# Patient Record
Sex: FEMALE | Race: WHITE | Employment: FULL TIME | URBAN - METROPOLITAN AREA
[De-identification: names, ages, dates, MRNs, and addresses within clinical notes are randomized per-mention and may not be internally consistent; named-entity substitution may affect disease eponyms.]

---

## 2021-12-09 ENCOUNTER — HOSPITAL ENCOUNTER (EMERGENCY)
Age: 51
Discharge: HOME | End: 2021-12-09
Attending: STUDENT IN AN ORGANIZED HEALTH CARE EDUCATION/TRAINING PROGRAM

## 2021-12-09 ENCOUNTER — APPOINTMENT (OUTPATIENT)
Dept: GENERAL RADIOLOGY | Age: 51
End: 2021-12-09
Attending: PHYSICIAN ASSISTANT

## 2021-12-09 VITALS
DIASTOLIC BLOOD PRESSURE: 82 MMHG | RESPIRATION RATE: 16 BRPM | HEIGHT: 64 IN | OXYGEN SATURATION: 97 % | WEIGHT: 230 LBS | TEMPERATURE: 98 F | BODY MASS INDEX: 39.27 KG/M2 | HEART RATE: 60 BPM | SYSTOLIC BLOOD PRESSURE: 143 MMHG

## 2021-12-09 DIAGNOSIS — R05.9 COUGH: Primary | ICD-10-CM

## 2021-12-09 LAB
FLUAV AG NPH QL IA: NEGATIVE
FLUBV AG NPH QL IA: NEGATIVE
SPECIMEN SOURCE: NORMAL

## 2021-12-09 PROCEDURE — 87804 INFLUENZA ASSAY W/OPTIC: CPT

## 2021-12-09 PROCEDURE — 96372 THER/PROPH/DIAG INJ SC/IM: CPT

## 2021-12-09 PROCEDURE — 74011250636 HC RX REV CODE- 250/636: Performed by: STUDENT IN AN ORGANIZED HEALTH CARE EDUCATION/TRAINING PROGRAM

## 2021-12-09 PROCEDURE — 99283 EMERGENCY DEPT VISIT LOW MDM: CPT

## 2021-12-09 PROCEDURE — 71046 X-RAY EXAM CHEST 2 VIEWS: CPT

## 2021-12-09 RX ORDER — DOXYCYCLINE HYCLATE 100 MG
100 TABLET ORAL 2 TIMES DAILY
Qty: 20 TABLET | Refills: 0 | Status: SHIPPED | OUTPATIENT
Start: 2021-12-09 | End: 2021-12-19

## 2021-12-09 RX ORDER — FLUCONAZOLE 150 MG/1
150 TABLET ORAL
Qty: 1 TABLET | Refills: 0 | Status: SHIPPED | OUTPATIENT
Start: 2021-12-09 | End: 2021-12-09

## 2021-12-09 RX ORDER — GUAIFENESIN AND DEXTROMETHORPHAN HYDROBROMIDE 1200; 60 MG/1; MG/1
1 TABLET, EXTENDED RELEASE ORAL EVERY 12 HOURS
Qty: 20 TABLET | Refills: 0 | Status: SHIPPED | OUTPATIENT
Start: 2021-12-09 | End: 2021-12-14

## 2021-12-09 RX ORDER — ALBUTEROL SULFATE 90 UG/1
2 AEROSOL, METERED RESPIRATORY (INHALATION)
Qty: 6.7 G | Refills: 0 | Status: SHIPPED | OUTPATIENT
Start: 2021-12-09

## 2021-12-09 RX ORDER — PREDNISONE 20 MG/1
40 TABLET ORAL DAILY
Qty: 8 TABLET | Refills: 0 | Status: SHIPPED | OUTPATIENT
Start: 2021-12-09 | End: 2021-12-13

## 2021-12-09 RX ORDER — KETOROLAC TROMETHAMINE 30 MG/ML
60 INJECTION, SOLUTION INTRAMUSCULAR; INTRAVENOUS
Status: COMPLETED | OUTPATIENT
Start: 2021-12-09 | End: 2021-12-09

## 2021-12-09 RX ADMIN — KETOROLAC TROMETHAMINE 60 MG: 30 INJECTION, SOLUTION INTRAMUSCULAR at 09:18

## 2021-12-09 NOTE — ED PROVIDER NOTES
40-year-old female patient presents to the emergency department with reports of upper respiratory congestion, cough, shortness of breath, intermittent bouts of chest tightness, headache and fever. She states symptoms started last week. She denies known sick contacts but works in [employer] locally. She is not established with a primary care provider but sees a provider out of [place] via telehealth for medication she is prescribed. She reports history of bicuspid aortic valve, chronic immunodeficiency disorder psoriatic arthritis. She did not get vaccinated for Covid or flu she has a history of issues with vaccines. Cough is  not particularly productive. Chest pressure is described as tightness throughout her chest that comes and goes randomly. She reports fevers as high as 102 earlier this week. No past medical history on file. No past surgical history on file. No family history on file. Social History     Socioeconomic History    Marital status:      Spouse name: Not on file    Number of children: Not on file    Years of education: Not on file    Highest education level: Not on file   Occupational History    Not on file   Tobacco Use    Smoking status: Not on file    Smokeless tobacco: Not on file   Substance and Sexual Activity    Alcohol use: Not on file    Drug use: Not on file    Sexual activity: Not on file   Other Topics Concern    Not on file   Social History Narrative    Not on file     Social Determinants of Health     Financial Resource Strain:     Difficulty of Paying Living Expenses: Not on file   Food Insecurity:     Worried About Running Out of Food in the Last Year: Not on file    Christen of Food in the Last Year: Not on file   Transportation Needs:     Lack of Transportation (Medical): Not on file    Lack of Transportation (Non-Medical):  Not on file   Physical Activity:     Days of Exercise per Week: Not on file    Minutes of Exercise per Session: Not on file   Stress:     Feeling of Stress : Not on file   Social Connections:     Frequency of Communication with Friends and Family: Not on file    Frequency of Social Gatherings with Friends and Family: Not on file    Attends Druze Services: Not on file    Active Member of Clubs or Organizations: Not on file    Attends Club or Organization Meetings: Not on file    Marital Status: Not on file   Intimate Partner Violence:     Fear of Current or Ex-Partner: Not on file    Emotionally Abused: Not on file    Physically Abused: Not on file    Sexually Abused: Not on file   Housing Stability:     Unable to Pay for Housing in the Last Year: Not on file    Number of [place] in the Last Year: Not on file    Unstable Housing in the Last Year: Not on file         ALLERGIES: Latex; Penicillin g; and Stings [sting, bee]    Review of Systems   Constitutional: Positive for chills and fever. Negative for diaphoresis. HENT: Negative for congestion, sneezing and sore throat. Eyes: Negative for visual disturbance. Respiratory: Positive for cough and shortness of breath. Negative for chest tightness and wheezing. Cardiovascular: Negative for chest pain and leg swelling. Gastrointestinal: Negative for abdominal pain, blood in stool, diarrhea, nausea and vomiting. Endocrine: Negative for polyuria. Genitourinary: Negative for difficulty urinating, dysuria, flank pain, hematuria and urgency. Musculoskeletal: Negative for back pain, myalgias, neck pain and neck stiffness. Skin: Negative for color change and rash. Neurological: Negative for dizziness, syncope, speech difficulty, weakness, light-headedness, numbness and headaches. Psychiatric/Behavioral: Negative for behavioral problems. All other systems reviewed and are negative.       Vitals:    12/09/21 0836   BP: 139/70   Pulse: (!) 52   Resp: 16   Temp: 98 °F (36.7 °C)   SpO2: 99%   Weight: 104.3 kg (230 lb)   Height: 5' 4\" (1.626 m)            Physical Exam  Vitals and nursing note reviewed. Constitutional:       General: She is not in acute distress. Appearance: She is well-developed. She is not diaphoretic. Comments: Generally well-appearing female patient, alert and oriented to person place and time. No acute distress, speaks in clear, fluid sentences. HENT:      Head: Normocephalic and atraumatic. Right Ear: Tympanic membrane, ear canal and external ear normal.      Left Ear: Ear canal and external ear normal. There is impacted cerumen. Nose: Nose normal.   Eyes:      Pupils: Pupils are equal, round, and reactive to light. Cardiovascular:      Rate and Rhythm: Normal rate and regular rhythm. Heart sounds: Normal heart sounds. No murmur heard. No friction rub. No gallop. Pulmonary:      Effort: Pulmonary effort is normal. No respiratory distress. Breath sounds: Normal breath sounds. No stridor. No decreased breath sounds, wheezing, rhonchi or rales. Comments: Clear to auscultation throughout. No focal findings of patient coughs significantly with deep inspiration peer  Chest:      Chest wall: No tenderness. Abdominal:      General: There is no distension. Palpations: Abdomen is soft. There is no mass. Tenderness: There is no abdominal tenderness. There is no guarding or rebound. Hernia: No hernia is present. Musculoskeletal:         General: No tenderness or deformity. Normal range of motion. Cervical back: Normal range of motion. Skin:     General: Skin is warm and dry. Neurological:      Mental Status: She is alert and oriented to person, place, and time. Cranial Nerves: No cranial nerve deficit. MDM  Number of Diagnoses or Management Options  Diagnosis management comments: Patient arrives with stable vital signs. EKG is reassuring and shows no evidence of ischemic change or significant tachycardia.   Symptoms consistent with upper respiratory illness. She does have history of immunodeficiency disorder, will err on the side of antibiotics assuming flu is negative. She was tested for Covid days ago with negative results. Amount and/or Complexity of Data Reviewed  Tests in the radiology section of CPT®: ordered and reviewed  Tests in the medicine section of CPT®: ordered and reviewed    Risk of Complications, Morbidity, and/or Mortality  Presenting problems: moderate  Diagnostic procedures: low  Management options: moderate    Patient Progress  Patient progress: stable    ED Course as of 12/09/21 0905   Thu Dec 09, 2021   6660 EKG interpretation: Sinus bradycardia, rate of 54, no ischemia.  [BR]      ED Course User Index  [BR] Levi Don, DO       Procedures

## 2021-12-09 NOTE — DISCHARGE INSTRUCTIONS
Take the antibiotic prescribed as directed. Use the medication for cough and pain as needed.   Return for worsening symptoms, concerns or questions

## 2021-12-09 NOTE — ED TRIAGE NOTES
Last Wed started with headache, sinus congestion, sore throat, and tightness in chest; \"feels like a heviness or something sitting on my chest\" States it is harder to breathe, more on the left side than the right. Masked during triage \"I usually get pneumonia or bronchitis this time of year. \"

## 2022-06-17 ENCOUNTER — OFFICE VISIT (OUTPATIENT)
Dept: INTERNAL MEDICINE CLINIC | Facility: CLINIC | Age: 52
End: 2022-06-17
Payer: COMMERCIAL

## 2022-06-17 VITALS
HEART RATE: 55 BPM | WEIGHT: 263 LBS | TEMPERATURE: 97 F | BODY MASS INDEX: 44.9 KG/M2 | HEIGHT: 64 IN | DIASTOLIC BLOOD PRESSURE: 68 MMHG | SYSTOLIC BLOOD PRESSURE: 124 MMHG | OXYGEN SATURATION: 98 %

## 2022-06-17 DIAGNOSIS — M16.0 PRIMARY OSTEOARTHRITIS OF BOTH HIPS: ICD-10-CM

## 2022-06-17 DIAGNOSIS — R12 HEARTBURN: ICD-10-CM

## 2022-06-17 DIAGNOSIS — F41.1 GENERALIZED ANXIETY DISORDER: Primary | ICD-10-CM

## 2022-06-17 DIAGNOSIS — Q23.1 BICUSPID AORTIC VALVE: ICD-10-CM

## 2022-06-17 DIAGNOSIS — G25.81 RLS (RESTLESS LEGS SYNDROME): ICD-10-CM

## 2022-06-17 PROBLEM — Q23.81 BICUSPID AORTIC VALVE: Status: ACTIVE | Noted: 2022-06-17

## 2022-06-17 PROCEDURE — 99204 OFFICE O/P NEW MOD 45 MIN: CPT | Performed by: NURSE PRACTITIONER

## 2022-06-17 RX ORDER — FLUOXETINE HYDROCHLORIDE 40 MG/1
CAPSULE ORAL
COMMUNITY
Start: 2019-06-10 | End: 2022-06-17 | Stop reason: SDUPTHER

## 2022-06-17 RX ORDER — PRAMIPEXOLE DIHYDROCHLORIDE 1 MG/1
TABLET ORAL
COMMUNITY
End: 2022-06-17 | Stop reason: SDUPTHER

## 2022-06-17 RX ORDER — VITAMIN B COMPLEX
1 CAPSULE ORAL DAILY
COMMUNITY

## 2022-06-17 RX ORDER — CELECOXIB 200 MG/1
CAPSULE ORAL
COMMUNITY
End: 2022-06-17 | Stop reason: SDUPTHER

## 2022-06-17 RX ORDER — OMEPRAZOLE 20 MG/1
TABLET, DELAYED RELEASE ORAL
COMMUNITY
End: 2022-06-17 | Stop reason: SDUPTHER

## 2022-06-17 RX ORDER — PRAMIPEXOLE DIHYDROCHLORIDE 1 MG/1
TABLET ORAL
Qty: 90 TABLET | Refills: 1 | Status: SHIPPED | OUTPATIENT
Start: 2022-06-17

## 2022-06-17 RX ORDER — CELECOXIB 200 MG/1
CAPSULE ORAL
Qty: 180 CAPSULE | Refills: 1 | Status: SHIPPED | OUTPATIENT
Start: 2022-06-17

## 2022-06-17 RX ORDER — CLONAZEPAM 1 MG/1
1 TABLET ORAL 2 TIMES DAILY
Qty: 60 TABLET | Refills: 0 | Status: SHIPPED | OUTPATIENT
Start: 2022-06-17 | End: 2022-07-17

## 2022-06-17 RX ORDER — FLUOXETINE HYDROCHLORIDE 40 MG/1
40 CAPSULE ORAL DAILY
Qty: 90 CAPSULE | Refills: 1 | Status: SHIPPED | OUTPATIENT
Start: 2022-06-17

## 2022-06-17 RX ORDER — CLONAZEPAM 1 MG/1
TABLET ORAL 2 TIMES DAILY
COMMUNITY
Start: 2021-02-10 | End: 2022-06-17 | Stop reason: SDUPTHER

## 2022-06-17 RX ORDER — OMEPRAZOLE 20 MG/1
TABLET, DELAYED RELEASE ORAL
Qty: 180 TABLET | Refills: 1 | Status: SHIPPED | OUTPATIENT
Start: 2022-06-17

## 2022-06-17 ASSESSMENT — ENCOUNTER SYMPTOMS
CONSTIPATION: 0
EYE REDNESS: 0
APNEA: 0
COUGH: 0
DIARRHEA: 0
EYE DISCHARGE: 0
NAUSEA: 0
COLOR CHANGE: 0
EYE PAIN: 0
SHORTNESS OF BREATH: 0
BACK PAIN: 0
SINUS PAIN: 0
EYE ITCHING: 0
ABDOMINAL DISTENTION: 0
ABDOMINAL PAIN: 0

## 2022-06-17 NOTE — PROGRESS NOTES
[address]      PROGRESS NOTE    SUBJECTIVE:   Man Marquis is a 46 y.o. female seen for a follow up visit regarding the following chief complaint:     Chief Complaint   Patient presents with    New Patient       HPI  Patient presents as NP to establish care. HTN: She is compliant with current treatment regimen, denies any intolerance. She denies any headache, dizziness or edema. She takes this for bicuspid aortic valve. She had followed cardiology with last heart catheter 2020. Due every 5 years. Denies any changes or concerns with this. She has been on anxiety and depression management therapy for quiet some time due to history of trauma; managed with klonopin and prozac routinely. She uses klonopin only as needed, not regularly. She takes mirapex nightly for RLS and this works well to manage her symptoms. She is also on celebrex for arthritis, osteoarthritis in hips and psoriatic. She takes prilosec BID to help with SE of metoprolol causing heartburn. She states her symptoms are controlled with this medication and she would like to continue. Due for routine labs. Due for mammogram; states she had one last 1/20; would like to hold on this for now. Due for colon cancer screening. Would like to hold off on this for now. Due for routine PAP. Current Outpatient Medications   Medication Sig Dispense Refill    b complex vitamins capsule Take 1 capsule by mouth daily      Metoprolol Succinate 50 MG CS24 Take 1 tablet by mouth in the morning and at bedtime 180 capsule 1    FLUoxetine (PROZAC) 40 MG capsule Take 1 capsule by mouth daily 90 capsule 1    clonazePAM (KLONOPIN) 1 MG tablet Take 1 tablet by mouth in the morning and at bedtime for 30 days. 60 tablet 0    celecoxib (CELEBREX) 200 MG capsule Celebrex 200 mg capsule  Take 1 capsule twice a day by oral route.  180 capsule 1    omeprazole (PRILOSEC OTC) 20 MG tablet omeprazole 20 mg tablet,delayed release  Take 1 tablet every day by oral route. 180 tablet 1    pramipexole (MIRAPEX) 1 MG tablet Mirapex 1 mg tablet  Take 1 tablet every day by oral route. 90 tablet 1     No current facility-administered medications for this visit. Allergies   Allergen Reactions    Latex Rash     itching    Bee Venom Anaphylaxis    Penicillin G Swelling     Throat swelling, difficulty breathing       History reviewed. No pertinent past medical history. Past Surgical History:   Procedure Laterality Date    ACHILLES TENDON SURGERY  2017    OTHER SURGICAL HISTORY      herniated belly button    PARTIAL HYSTERECTOMY (CERVIX NOT REMOVED)  2014    only right ovary removed    TONSILLECTOMY       Family History   Problem Relation Age of Onset    Stroke Mother     High Blood Pressure Mother     No Known Problems Father      Social History     Tobacco Use    Smoking status: Former Smoker     Quit date: 2020     Years since quittin.0    Smokeless tobacco: Not on file   Substance Use Topics    Alcohol use: Yes     Comment: socially       Review of Systems   Constitutional: Negative for activity change, appetite change, chills, fatigue and fever. HENT: Negative for congestion, ear pain and sinus pain. Eyes: Negative for pain, discharge, redness and itching. Respiratory: Negative for apnea, cough and shortness of breath. Cardiovascular: Negative for chest pain, palpitations and leg swelling. Gastrointestinal: Negative for abdominal distention, abdominal pain, constipation, diarrhea and nausea. Endocrine: Negative for cold intolerance and heat intolerance. Genitourinary: Negative for difficulty urinating, dysuria, enuresis and urgency. Musculoskeletal: Negative for arthralgias, back pain, joint swelling, myalgias and neck pain. Skin: Negative for color change and rash. Neurological: Negative for dizziness, weakness and headaches.    Psychiatric/Behavioral: Negative for behavioral problems and sleep disturbance. The patient is not nervous/anxious. OBJECTIVE:  /68 (Site: Left Upper Arm, Position: Sitting, Cuff Size: Large Adult)   Pulse 55   Temp 97 °F (36.1 °C) (Temporal)   Ht 5' 3.5\" (1.613 m)   Wt 263 lb (119.3 kg)   SpO2 98%   BMI 45.86 kg/m²      Physical Exam  Constitutional:       General: She is not in acute distress. Appearance: Normal appearance. She is not ill-appearing or toxic-appearing. Cardiovascular:      Rate and Rhythm: Normal rate and regular rhythm. Pulmonary:      Effort: Pulmonary effort is normal. No respiratory distress. Skin:     General: Skin is warm and dry. Neurological:      Mental Status: She is alert. Mental status is at baseline. Psychiatric:         Mood and Affect: Mood normal.         Behavior: Behavior normal.         Thought Content: Thought content normal.           [facility]. Diagnoses and all orders for this visit:    Generalized anxiety disorder  -     FLUoxetine (PROZAC) 40 MG capsule; Take 1 capsule by mouth daily  -     clonazePAM (KLONOPIN) 1 MG tablet; Take 1 tablet by mouth in the morning and at bedtime for 30 days. Bicuspid aortic valve  -     Metoprolol Succinate 50 MG CS24; Take 1 tablet by mouth in the morning and at bedtime    Primary osteoarthritis of both hips  -     celecoxib (CELEBREX) 200 MG capsule; Celebrex 200 mg capsule  Take 1 capsule twice a day by oral route. RLS (restless legs syndrome)  -     pramipexole (MIRAPEX) 1 MG tablet; Mirapex 1 mg tablet  Take 1 tablet every day by oral route. Heartburn  -     omeprazole (PRILOSEC OTC) 20 MG tablet; omeprazole 20 mg tablet,delayed release  Take 1 tablet every day by oral route. Continue all medications as perscribed today as these are presumably working well to manage your symptoms. Obtain labs and follow up as scheduled to review and discuss concerns of weight loss management.   Greater than 50% of this 35 min visit was spent counseling the patient about test results, prognosis, importance of compliance, education about disease process, benefits of medications, instructions for management of acute symptoms, and follow up plans. Follow-up and Dispositions    · Return for fasting labs anytime and establish with Dr. Sen Mcdonnell to discuss results. Yassine Hennessy, MONICA, APRN - CNP

## 2022-07-01 ENCOUNTER — TELEPHONE (OUTPATIENT)
Dept: INTERNAL MEDICINE CLINIC | Facility: CLINIC | Age: 52
End: 2022-07-01

## 2022-07-01 DIAGNOSIS — I49.9 CARDIAC ARRHYTHMIA, UNSPECIFIED CARDIAC ARRHYTHMIA TYPE: Primary | ICD-10-CM

## 2022-07-01 NOTE — TELEPHONE ENCOUNTER
I have never seen this patient, saw Luh Friday as a new patient last month; labs ordered;     From: Heidi Barber  To: Dr. Darian Gee: 7/1/2022 12:25 PM EDT  Subject: Labs    I still have not received an order for my labs. Can they be added to my chart so I can get them done asap.  Thank you

## 2022-07-05 ENCOUNTER — TELEPHONE (OUTPATIENT)
Dept: INTERNAL MEDICINE CLINIC | Facility: CLINIC | Age: 52
End: 2022-07-05

## 2022-07-05 NOTE — TELEPHONE ENCOUNTER
Pt was called to be informed that labs was put in. Labs was faxed over to her.     Thank you    Mary Ann Kilgore

## 2022-07-08 ENCOUNTER — TELEMEDICINE (OUTPATIENT)
Dept: INTERNAL MEDICINE CLINIC | Facility: CLINIC | Age: 52
End: 2022-07-08
Payer: COMMERCIAL

## 2022-07-08 DIAGNOSIS — Q23.1 BICUSPID AORTIC VALVE: ICD-10-CM

## 2022-07-08 DIAGNOSIS — F41.1 GENERALIZED ANXIETY DISORDER: Primary | ICD-10-CM

## 2022-07-08 DIAGNOSIS — E66.01 CLASS 3 SEVERE OBESITY DUE TO EXCESS CALORIES WITH SERIOUS COMORBIDITY AND BODY MASS INDEX (BMI) OF 40.0 TO 44.9 IN ADULT (HCC): ICD-10-CM

## 2022-07-08 DIAGNOSIS — Z91.030 BEE STING ALLERGY: ICD-10-CM

## 2022-07-08 DIAGNOSIS — Z12.31 VISIT FOR SCREENING MAMMOGRAM: ICD-10-CM

## 2022-07-08 DIAGNOSIS — Z12.11 SCREEN FOR COLON CANCER: ICD-10-CM

## 2022-07-08 PROBLEM — E66.813 CLASS 3 SEVERE OBESITY DUE TO EXCESS CALORIES IN ADULT: Status: ACTIVE | Noted: 2022-07-08

## 2022-07-08 PROCEDURE — 99214 OFFICE O/P EST MOD 30 MIN: CPT | Performed by: INTERNAL MEDICINE

## 2022-07-08 RX ORDER — EPINEPHRINE 0.3 MG/.3ML
0.3 INJECTION SUBCUTANEOUS ONCE
Qty: 0.3 ML | Refills: 0 | Status: SHIPPED | OUTPATIENT
Start: 2022-07-08 | End: 2022-07-08

## 2022-07-08 ASSESSMENT — ENCOUNTER SYMPTOMS: RESPIRATORY NEGATIVE: 1

## 2022-07-08 NOTE — PROGRESS NOTES
[facility]      2022    Patient Name: Noa Grossman  :  1970    Subjective:    Chief Complaint:  Chief Complaint   Patient presents with    Follow-up         HPI I was at home while conducting this encounter. Consent:  She and/or her healthcare decision maker is aware that this patient-initiated Telehealth encounter is a billable service, with coverage as determined by her insurance carrier. She is aware that she may receive a bill and has provided verbal consent to proceed: Yes    This virtual visit was conducted via [employer]. Pursuant to the emergency declaration under the [facility], 1135 waiver authority and the [employer] Act, this Virtual  Visit was conducted to reduce the patient's risk of exposure to COVID-19 and provide continuity of care for an established patient. Services were provided through a video synchronous discussion virtually to substitute for in-person clinic visit. Due to this being a TeleHealth evaluation, many elements of the physical examination are unable to be assessed. Total Time: minutes: 21-30 minutes. Patient evaluated for f/u visit; She saw Letty Thompson NP in our office as a new patient last month; she plans to have fasting labs done this weekend and will be contacted with results when available; She has history of anxiety, taking Prozac 40 mg qd and Klonopin 1 mg qhs prn and feeling well; She has history of bicuspid aortic valve; she saw Cardiologist in [place] 4 years ago, had cardiac MRI, cardiac cath, stress testing at that time; she is taking Metoprolol as prescribed and is feeling well;    She is concerned about her weight; she has tried Adderall in the past, but developed palpitations; she generally follows a healthy diet, walks for an hour daily; she walks 12,000 steps per day; she usually does a protein shake for breakfast and lunch and tries to eat a healthy dinner; she's tried Wellbutrin in the past and could not tolerate, felt very angry all the time; she is not ready for Bariatric Surgery at this time;     Past Medical History:   Diagnosis Date    Anxiety     Bicuspid aortic valve     Primary osteoarthritis of both hips     RLS (restless legs syndrome)        Past Surgical History:   Procedure Laterality Date    ACHILLES TENDON SURGERY  2017    OTHER SURGICAL HISTORY      herniated belly button    PARTIAL HYSTERECTOMY (CERVIX NOT REMOVED)  2014    only right ovary removed    TONSILLECTOMY         Family History   Problem Relation Age of Onset    Stroke Mother     High Blood Pressure Mother     Uterine Cancer Mother     No Known Problems Father     Hypertension Maternal Grandmother     Diabetes Maternal Grandmother     Breast Cancer Maternal Aunt        Social History     Tobacco Use    Smoking status: Former Smoker     Quit date: 2020     Years since quittin.0    Smokeless tobacco: Never Used   Vaping Use    Vaping Use: Never used   Substance Use Topics    Alcohol use: Yes     Comment: socially    Drug use: Never                 Current Outpatient Medications:     orlistat (XENICAL) 120 MG capsule, Take 1 capsule by mouth 3 times daily (with meals), Disp: 90 capsule, Rfl: 2    EPINEPHrine (EPIPEN 2-IDALIA) 0.3 MG/0.3ML SOAJ injection, Inject 0.3 mLs into the skin once for 1 dose Use as directed for allergic reaction, Disp: 0.3 mL, Rfl: 0    b complex vitamins capsule, Take 1 capsule by mouth daily, Disp: , Rfl:     Metoprolol Succinate 50 MG CS24, Take 1 tablet by mouth in the morning and at bedtime, Disp: 180 capsule, Rfl: 1    FLUoxetine (PROZAC) 40 MG capsule, Take 1 capsule by mouth daily, Disp: 90 capsule, Rfl: 1    clonazePAM (KLONOPIN) 1 MG tablet, Take 1 tablet by mouth in the morning and at bedtime for 30 days. , Disp: 60 tablet, Rfl: 0    celecoxib (CELEBREX) 200 MG capsule, Celebrex 200 mg capsule Take 1 capsule twice a day by oral route., Disp: 180 capsule, Rfl: 1    omeprazole (PRILOSEC OTC) 20 MG tablet, omeprazole 20 mg tablet,delayed release  Take 1 tablet every day by oral route., Disp: 180 tablet, Rfl: 1    pramipexole (MIRAPEX) 1 MG tablet, Mirapex 1 mg tablet  Take 1 tablet every day by oral route., Disp: 90 tablet, Rfl: 1    Allergies   Allergen Reactions    Latex Rash and Other (See Comments)     itching    Bee Venom Anaphylaxis    Penicillin G Swelling     Throat swelling, difficulty breathing    Other        Review of Systems   Constitutional: Positive for unexpected weight change. HENT: Negative. Respiratory: Negative. Cardiovascular: Negative. Objective: There were no vitals taken for this visit. Examination:  Physical Exam  Constitutional:       Appearance: She is obese. Neurological:      Mental Status: She is alert. Psychiatric:         Mood and Affect: Mood normal.         Thought Content: Thought content normal.         Judgment: Judgment normal.           Assessment/Plan:    Carlota Rasheed was seen today for follow-up. Diagnoses and all orders for this visit:    Generalized anxiety disorder  Stable on present medications, continue as prescribed      Bicuspid aortic valve  -     [address]    Class 3 severe obesity due to excess calories with serious comorbidity and body mass index (BMI) of 40.0 to 44.9 in adult [facility])  Recommended low fat, low cholesterol, low carbohydrate diet. Recommended increasing fresh fruits and vegetables in diet, eating lean meats, like fish and poultry, that are baked, broiled or grilled, not fried. Recommended regular exercise, 30 minutes of aerobic activity 4-5 days a week. Recommended monitoring weight closely and keeping follow-up appointments for recheck. -     orlistat (XENICAL) 120 MG capsule;  Take 1 capsule by mouth 3 times daily (with meals)    Visit for screening mammogram  -     [facility] DIGITAL SCREEN W OR WO CAD BILATERAL; Future    Screen for colon cancer  -     Cologuard (Fecal DNA Colorectal Cancer Screening); Future    Bee sting allergy  -     EPINEPHrine (EPIPEN 2-IDALIA) 0.3 MG/0.3ML SOAJ injection; Inject 0.3 mLs into the skin once for 1 dose Use as directed for allergic reaction          Follow-up and Dispositions    · Return in about 2 months (around 9/8/2022), or if symptoms worsen or fail to improve, for f/u. Medication Reconciliation:  Current Medications Verified: Current medications/ immunizations were reviewed, including purpose, with patient. Family History, Social History, Current and Past Medical History was reviewed with patient and updated at today's office visit. Medication Reconciliation list was given to patient/ family. Patient was advised to discard old medication lists and provide all providers with current list at each visit and carry list with them in case of emergency.     Completed By:   Deepti Tsai MD    [facility]  [address] [place] [address]50, [place] [address], [address] [place] [address] Rd  [phone]  [phone] fax  2:46 PM

## 2022-07-20 ENCOUNTER — COMMUNITY OUTREACH (OUTPATIENT)
Dept: INTERNAL MEDICINE CLINIC | Facility: CLINIC | Age: 52
End: 2022-07-20

## 2022-07-25 ENCOUNTER — COMMUNITY OUTREACH (OUTPATIENT)
Dept: INTERNAL MEDICINE CLINIC | Facility: CLINIC | Age: 52
End: 2022-07-25

## 2022-07-25 NOTE — PROGRESS NOTES
Patient's HM shows they are overdue for [place] [id number] and  files searched. No results to attach to order nor HM updated. Note says pt would like to hold off on mammogram right now.

## 2022-09-14 ENCOUNTER — TELEPHONE (OUTPATIENT)
Dept: INTERNAL MEDICINE CLINIC | Facility: CLINIC | Age: 52
End: 2022-09-14

## 2022-09-17 ENCOUNTER — APPOINTMENT (OUTPATIENT)
Dept: GENERAL RADIOLOGY | Age: 52
End: 2022-09-17
Payer: COMMERCIAL

## 2022-09-17 ENCOUNTER — HOSPITAL ENCOUNTER (EMERGENCY)
Dept: GENERAL RADIOLOGY | Age: 52
Discharge: HOME | End: 2022-09-20
Payer: COMMERCIAL

## 2022-09-17 ENCOUNTER — HOSPITAL ENCOUNTER (EMERGENCY)
Age: 52
Discharge: HOME | End: 2022-09-17
Attending: EMERGENCY MEDICINE
Payer: COMMERCIAL

## 2022-09-17 VITALS
HEART RATE: 71 BPM | BODY MASS INDEX: 42.82 KG/M2 | WEIGHT: 257 LBS | HEIGHT: 65 IN | SYSTOLIC BLOOD PRESSURE: 124 MMHG | DIASTOLIC BLOOD PRESSURE: 79 MMHG | RESPIRATION RATE: 19 BRPM | OXYGEN SATURATION: 99 % | TEMPERATURE: 98 F
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
## 2022-09-17 DIAGNOSIS — S60.221A CONTUSION OF RIGHT HAND, INITIAL ENCOUNTER: ICD-10-CM

## 2022-09-17 DIAGNOSIS — S80.10XA CONTUSION OF MULTIPLE SITES OF LOWER EXTREMITY, UNSPECIFIED LATERALITY, INITIAL ENCOUNTER: ICD-10-CM

## 2022-09-17 PROCEDURE — 29130 APPL FINGER SPLINT STATIC: CPT

## 2022-09-17 PROCEDURE — 73130 X-RAY EXAM OF HAND: CPT

## 2022-09-17 PROCEDURE — 72100 X-RAY EXAM L-S SPINE 2/3 VWS: CPT

## 2022-09-17 PROCEDURE — 73590 X-RAY EXAM OF LOWER LEG: CPT

## 2022-09-17 PROCEDURE — 99283 EMERGENCY DEPT VISIT LOW MDM: CPT

## 2022-09-17 RX ORDER — LIDOCAINE 50 MG/G
1 PATCH TOPICAL DAILY
Qty: 30 PATCH | Refills: 0 | Status: SHIPPED | OUTPATIENT
Start: 2022-09-17 | End: 2022-10-17

## 2022-09-17 ASSESSMENT — ENCOUNTER SYMPTOMS
VOMITING: 0
VISUAL CHANGE: 0
ABDOMINAL PAIN: 0
ALLERGIC/IMMUNOLOGIC NEGATIVE: 1
BOWEL INCONTINENCE: 0
NAUSEA: 0
GASTROINTESTINAL NEGATIVE: 1
EYES NEGATIVE: 1
RESPIRATORY NEGATIVE: 1

## 2022-09-17 ASSESSMENT — PAIN DESCRIPTION - LOCATION: LOCATION: HAND;LEG

## 2022-09-17 ASSESSMENT — PAIN DESCRIPTION - ORIENTATION: ORIENTATION: RIGHT

## 2022-09-17 ASSESSMENT — PAIN - FUNCTIONAL ASSESSMENT: PAIN_FUNCTIONAL_ASSESSMENT: 0-10

## 2022-09-17 NOTE — ED TRIAGE NOTES
Pt c/o right shin pain that shoots up her leg and bruising and right hand pain that radiates up the arm and bruising after falling on Thursday while at work. Pt ambulatory to triage.

## 2022-09-17 NOTE — ED NOTES
I have reviewed discharge instructions with the patient. The patient verbalized understanding. Patient left ED via Discharge Method: ambulatory to Home with self. Opportunity for questions and clarification provided. Patient given 1 scripts. To continue your aftercare when you leave the hospital, you may receive an automated call from our care team to check in on how you are doing. This is a free service and part of our promise to provide the best care and service to meet your aftercare needs.  If you have questions, or wish to unsubscribe from this service please call [phone]. Thank you for Choosing our [facility] Emergency Department.         Delbert Willis RN  09/17/22 4554

## 2022-09-17 NOTE — DISCHARGE INSTRUCTIONS
Rest, ice, elevate, avoid painful activities. ED if worse. Follow up with Ortho for recheck. Use ibuprofen and/or tylenol as directed.

## 2022-09-17 NOTE — ED PROVIDER NOTES
Emergency Department Provider Note                   PCP:                Earnest Jimenez MD               Age: 46 y.o. Sex: female       ICD-10-CM    1. Fall, initial encounter  [address] XXXA       2. Contusion of right hand, initial encounter  S60.221A       3. Contusion of multiple sites of lower extremity, unspecified laterality, initial encounter  S80.10XA           DISPOSITION Decision To Discharge 09/17/2022 05:22:47 PM        MDM  Risk of Complications, Morbidity, and/or Mortality  Presenting problems: moderate  Diagnostic procedures: moderate  Management options: moderate  General comments: Patient's hand and lower leg are bruised but there are no fractures. There is no sign of compartment syndrome. Patient was placed in a Velcro thumb spica splint. She asked specifically for something for pain at home. I have written for lidocaine patches and a muscle relaxer. I did write her a note out of work and refer her to work well for follow-up and work restrictions if needed. She should use ice to her joints and warm moist heat, massage and swelling to her back. She was instructed on signs and symptoms of compartment syndrome and to return immediately if they start. This did happen 2 days ago so it is reassuring the have not started. She should rest that leg and stay off of it as well. Patient is stable for discharge and ambulatory out of the ED without difficulty at this time.     Patient Progress  Patient progress: stable             Orders Placed This Encounter   Procedures    SPLINT APPLICATION    XR HAND RIGHT (MIN 3 VIEWS)    XR TIBIA FIBULA RIGHT (2 VIEWS)    XR Forearm Right    XR LUMBAR SPINE (2-3 VIEWS)    [facility] [employer] [employer] Thumb Spica, Right        Medications - No data to display    Discharge Medication List as of 9/17/2022  5:34 PM        START taking these medications    Details   lidocaine (LIDODERM) 5 % Place 1 patch onto the skin daily 12 hours on, 12 hours off., Disp-30 patch, R-0Normal              Duc Smith is a 46 y.o. female who presents to the Emergency Department with chief complaint of    Chief Complaint   Patient presents with    Fall      Patient slipped and fell on some [employer] at work on Thursday, subsequently landing on her right hand and right shin. She has pain to the right forearm, hand, low back and right shin. There is bruising to the hand admission. She is right-handed. She states it does hurt to move her hand. There is very minimal swelling to her leg but no tingling or weakness. No loss or retention of her urine or bowels. She has pain to her paralumbar area but did not hit that. She is a . No chest pain, shortness of breath, abdominal pain, dizziness, weakness, dyspnea exertion, orthopnea, swelling/tingling or weakness to her arms or legs or other new symptoms. She did ambulate to the room without difficulty and is well-hydrated. The history is provided by the patient. Fall  The accident occurred 2 days ago. The fall occurred while walking. She fell from a height of 3 to 5 ft. She landed on [place]. Point of impact: right hand, right lower leg, left knee. Pain location: Right hand, right lower leg pain, low back pain. The pain is at a severity of 5/10. The pain is moderate. She was Ambulatory at the scene. There was No entrapment after the fall. There was No drug use involved in the accident. There was No alcohol use involved in the accident. Pertinent negatives include no visual change, no fever, no numbness, no abdominal pain, no bowel incontinence, no nausea, no vomiting, no hematuria, no headaches, no hearing loss, no loss of consciousness and no tingling. The symptoms are aggravated by activity. Review of Systems   Constitutional: Negative. Negative for fever. HENT: Negative. Eyes: Negative. Respiratory: Negative. Cardiovascular: Negative. Gastrointestinal: Negative.   Negative for abdominal pain, bowel incontinence, nausea and vomiting. Endocrine: Negative. Genitourinary: Negative. Negative for hematuria. Musculoskeletal: Negative. Right hand, right lower leg, low back pain   Skin: Negative. Allergic/Immunologic: Negative. Neurological: Negative. Negative for tingling, loss of consciousness, numbness and headaches. Hematological: Negative. Psychiatric/Behavioral: Negative. All other systems reviewed and are negative.     Past Medical History:   Diagnosis Date    Anxiety     Bicuspid aortic valve     Primary osteoarthritis of both hips     RLS (restless legs syndrome)         Past Surgical History:   Procedure Laterality Date    ACHILLES TENDON SURGERY  2017    OTHER SURGICAL HISTORY      herniated belly button    PARTIAL HYSTERECTOMY (CERVIX NOT REMOVED)  2014    only right ovary removed    TONSILLECTOMY          Family History   Problem Relation Age of Onset    Stroke Mother     High Blood Pressure Mother     Uterine Cancer Mother     No Known Problems Father     Hypertension Maternal Grandmother     Diabetes Maternal Grandmother     Breast Cancer Maternal Aunt         Social History     Socioeconomic History    Marital status:     Number of children: 3   Occupational History    Occupation: medical assistant   Tobacco Use    Smoking status: Former     Types: Cigarettes     Quit date: 2020     Years since quittin.2    Smokeless tobacco: Never   Vaping Use    Vaping Use: Never used   Substance and Sexual Activity    Alcohol use: Yes     Comment: socially    Drug use: Never    Sexual activity: Yes     Partners: Male         Latex, Bee venom, Penicillin g, and Other     Discharge Medication List as of 2022  5:34 PM        CONTINUE these medications which have NOT CHANGED    Details   orlistat (XENICAL) 120 MG capsule Take 1 capsule by mouth 3 times daily (with meals), Disp-90 capsule, R-2Normal      EPINEPHrine (EPIPEN 2-IDALIA) 0.3 MG/0.3ML SOAJ injection Inject 0.3 mLs into the skin once for 1 dose Use as directed for allergic reaction, Disp-0.3 mL, R-0Normal      b complex vitamins capsule Take 1 capsule by mouth dailyHistorical Med      Metoprolol Succinate 50 MG CS24 Take 1 tablet by mouth in the morning and at bedtime, Disp-180 capsule, R-1Normal      FLUoxetine (PROZAC) 40 MG capsule Take 1 capsule by mouth daily, Disp-90 capsule, R-1Normal      clonazePAM (KLONOPIN) 1 MG tablet Take 1 tablet by mouth in the morning and at bedtime for 30 days. , Disp-60 tablet, R-0Normal      celecoxib (CELEBREX) 200 MG capsule Celebrex 200 mg capsule  Take 1 capsule twice a day by oral route., Disp-180 capsule, R-1Normal      omeprazole (PRILOSEC OTC) 20 MG tablet omeprazole 20 mg tablet,delayed release  Take 1 tablet every day by oral route., Disp-180 tablet, R-1Normal      pramipexole (MIRAPEX) 1 MG tablet Mirapex 1 mg tablet  Take 1 tablet every day by oral route., Disp-90 tablet, R-1Normal              Vitals signs and nursing note reviewed. No data found. Physical Exam  Vitals and nursing note reviewed. Constitutional:       Appearance: Normal appearance. HENT:      Head: Normocephalic and atraumatic. Right Ear: External ear normal.      Left Ear: External ear normal.      Nose: Nose normal.      Mouth/Throat:      Mouth: Mucous membranes are moist.   Eyes:      Extraocular Movements: Extraocular movements intact. Conjunctiva/sclera: Conjunctivae normal.      Pupils: Pupils are equal, round, and reactive to light. Cardiovascular:      Rate and Rhythm: Normal rate. Pulses: Normal pulses. Pulmonary:      Effort: Pulmonary effort is normal.   Abdominal:      General: Abdomen is flat. Palpations: Abdomen is soft. Musculoskeletal:         General: Tenderness and signs of injury present. No swelling or deformity. Normal range of motion. Right forearm: Tenderness and bony tenderness present.       Right wrist: Tenderness and bony tenderness present. Right hand: Tenderness and bony tenderness present. Arms:         Hands:       Cervical back: Normal and normal range of motion. Thoracic back: Normal.      Lumbar back: Spasms and tenderness present. No bony tenderness. Back:       Right knee: Normal.      Left knee: Ecchymosis present. No bony tenderness. Right lower leg: Tenderness and bony tenderness present. No edema. Left lower leg: No edema. Legs:       Comments: Tenderness palpation over the right proximal forearm and right hand. There is mild bruising to the right hand on the thenar eminence. There is mild swelling to the hand and mild limited range of motion. She is distally neurovascularly intact. There is bruising and very minimal swelling to the right anterior tibial area of the right lower leg. Negative Homans' sign. There is excellent dorsalis pedis and posterior tibialis pulses and movement of the foot. She is distally neurovascularly intact. No sign of compartment syndrome at this time. There is tenderness to the right paralumbar area with spasm. No spinal tenderness. She does have full range of motion and is distally neurovascularly intact. Skin:     General: Skin is warm. Capillary Refill: Capillary refill takes less than 2 seconds. Neurological:      General: No focal deficit present. Mental Status: She is alert and oriented to person, place, and time. Mental status is at baseline. GCS: GCS eye subscore is 4. GCS verbal subscore is 5. GCS motor subscore is 6. Cranial Nerves: Cranial nerves are intact. No cranial nerve deficit. Sensory: Sensation is intact. No sensory deficit. Motor: Motor function is intact. No weakness. Coordination: Coordination is intact. Coordination normal.      Gait: Gait is intact.  Gait normal.      Deep Tendon Reflexes: Reflexes normal.      Reflex Scores:       Tricep reflexes are 2+ on the right side and 2+ on the left side.       Bicep reflexes are 2+ on the right side and 2+ on the left side. Brachioradialis reflexes are 2+ on the right side and 2+ on the left side. Patellar reflexes are 2+ on the right side and 2+ on the left side. Achilles reflexes are 2+ on the right side and 2+ on the left side. Psychiatric:         Mood and Affect: Mood normal.         Behavior: Behavior normal.         Thought Content: Thought content normal.         Judgment: Judgment normal.        Splint Application    Date/Time: 9/17/2022 10:19 PM  Performed by: HIEN Adame Mai  Authorized by: Ulysses Cairo, DO     Consent:     Consent obtained:  Verbal    Consent given by:  Patient    Risks, benefits, and alternatives were discussed: yes      Risks discussed:  Discoloration, numbness, pain and swelling    Alternatives discussed:  No treatment, delayed treatment, alternative treatment, observation and referral  Universal protocol:     Imaging studies available: yes      Immediately prior to procedure a time out was called: yes      Patient identity confirmed:  Verbally with patient, arm band and provided demographic data  Pre-procedure details:     Distal neurologic exam:  Normal    Distal perfusion: distal pulses strong and brisk capillary refill    Procedure details:     Location:  Hand    Hand location:  R hand    Splint type:  Thumb spica    Supplies:  Prefabricated splint    Splint applied and adjusted personally by me: Corey Doyle RN. Post-procedure details:     Distal neurologic exam:  Normal    Procedure completion:  Tolerated well, no immediate complications    Results for orders placed or performed during the hospital encounter of 09/17/22   XR HAND RIGHT (MIN 3 VIEWS)    Narrative    Right hand    INDICATION: Right hand pain after fall    Three views of the right hand were obtained. FINDINGS: There is no evidence of fracture or dislocation.   A few small cysts or  erosions in the carpal bones, most evident in the scaphoid. There is no  significant joint space narrowing. Impression    No acute findings     XR TIBIA FIBULA RIGHT (2 VIEWS)    Narrative    Right Tib/fib    INDICATION:Leg pain after fall    AP and lateral views of the right tibia and fibula were obtained. FINDINGS: There is no evidence of fracture or other acute bony abnormality in  the right lower leg. The tibia and fibula are intact. There is mild  degenerative change in the knee. Impression    No acute findings   XR Forearm Right    Narrative    Right forearm    INDICATION:Arm pain after fall    AP and lateral views of the right forearm were obtained. FINDINGS: There is no evidence of fracture or other acute bony abnormality in  the right forearm. The wrist and elbow are also unremarkable. Impression    Negative right forearm   XR LUMBAR SPINE (2-3 VIEWS)    Narrative    Lumbar Spine    INDICATION:  Back pain after fall     AP and lateral views of the lumbar spine were obtained    FINDINGS: There is mild degenerative change in the lower lumbar spine, most  evidence in the facets. There is no evidence of fracture or subluxation. The  vertebrae are well aligned. No bony lesions are seen. Impression    No evidence of fracture or other acute abnormality in the lumbar  spine. XR Forearm Right   Final Result   Negative right forearm      XR LUMBAR SPINE (2-3 VIEWS)   Final Result   No evidence of fracture or other acute abnormality in the lumbar   spine. XR HAND RIGHT (MIN 3 VIEWS)   Final Result   No acute findings         XR TIBIA FIBULA RIGHT (2 VIEWS)   Final Result   No acute findings                          Voice dictation software was used during the making of this note. This software is not perfect and grammatical and other typographical errors may be present. This note has not been completely proofread for errors.      HIEN Ramírez  09/17/22 [id number]

## 2022-12-27 ENCOUNTER — HOSPITAL ENCOUNTER (EMERGENCY)
Dept: GENERAL RADIOLOGY | Age: 52
Discharge: HOME | End: 2022-12-30

## 2022-12-27 ENCOUNTER — HOSPITAL ENCOUNTER (EMERGENCY)
Dept: CT IMAGING | Age: 52
Discharge: HOME | End: 2022-12-30

## 2022-12-27 ENCOUNTER — HOSPITAL ENCOUNTER (EMERGENCY)
Age: 52
Discharge: ANOTHER HOSPITAL | End: 2022-12-27
Attending: EMERGENCY MEDICINE

## 2022-12-27 VITALS
SYSTOLIC BLOOD PRESSURE: 136 MMHG | WEIGHT: 260 LBS | RESPIRATION RATE: 20 BRPM | OXYGEN SATURATION: 95 % | HEART RATE: 56 BPM | BODY MASS INDEX: 43.32 KG/M2 | DIASTOLIC BLOOD PRESSURE: 65 MMHG | HEIGHT: 65 IN | TEMPERATURE: 98.3 F

## 2022-12-27 DIAGNOSIS — J98.01 ACUTE BRONCHOSPASM: ICD-10-CM

## 2022-12-27 DIAGNOSIS — U07.1 COVID-19 VIRUS INFECTION: Primary | ICD-10-CM

## 2022-12-27 PROBLEM — I24.89 DEMAND ISCHEMIA: Status: ACTIVE | Noted: 2022-12-27

## 2022-12-27 PROBLEM — I24.8 DEMAND ISCHEMIA (HCC): Status: ACTIVE | Noted: 2022-12-27

## 2022-12-27 LAB
ALBUMIN SERPL-MCNC: 3.6 G/DL (ref 3.5–5)
ALBUMIN/GLOB SERPL: 0.9 {RATIO} (ref 0.4–1.6)
ALP SERPL-CCNC: 85 U/L (ref 50–130)
ALT SERPL-CCNC: 36 U/L (ref 12–65)
ANION GAP SERPL CALC-SCNC: 10 MMOL/L (ref 2–11)
AST SERPL-CCNC: 15 U/L (ref 15–37)
BASOPHILS # BLD: 0 K/UL (ref 0–0.2)
BASOPHILS NFR BLD: 0 % (ref 0–2)
BILIRUB SERPL-MCNC: 0.3 MG/DL (ref 0.2–1.1)
BUN SERPL-MCNC: 22 MG/DL (ref 6–23)
CALCIUM SERPL-MCNC: 9.2 MG/DL (ref 8.3–10.4)
CHLORIDE SERPL-SCNC: 106 MMOL/L (ref 101–110)
CO2 SERPL-SCNC: 24 MMOL/L (ref 21–32)
CREAT SERPL-MCNC: 0.91 MG/DL (ref 0.6–1)
D DIMER PPP FEU-MCNC: 0.42 UG/ML(FEU)
DIFFERENTIAL METHOD BLD: ABNORMAL
EOSINOPHIL # BLD: 0 K/UL (ref 0–0.8)
EOSINOPHIL NFR BLD: 0 % (ref 0.5–7.8)
ERYTHROCYTE [DISTWIDTH] IN BLOOD BY AUTOMATED COUNT: 13.1 % (ref 11.9–14.6)
GLOBULIN SER CALC-MCNC: 4 G/DL (ref 2.8–4.5)
GLUCOSE SERPL-MCNC: 167 MG/DL (ref 65–100)
HCT VFR BLD AUTO: 40.2 % (ref 35.8–46.3)
HGB BLD-MCNC: 13.3 G/DL (ref 11.7–15.4)
IMM GRANULOCYTES # BLD AUTO: 0 K/UL (ref 0–0.5)
IMM GRANULOCYTES NFR BLD AUTO: 0 % (ref 0–5)
LYMPHOCYTES # BLD: 1.3 K/UL (ref 0.5–4.6)
LYMPHOCYTES NFR BLD: 18 % (ref 13–44)
MAGNESIUM SERPL-MCNC: 2.1 MG/DL (ref 1.8–2.4)
MCH RBC QN AUTO: 29.6 PG (ref 26.1–32.9)
MCHC RBC AUTO-ENTMCNC: 33.1 G/DL (ref 31.4–35)
MCV RBC AUTO: 89.5 FL (ref 82–102)
MONOCYTES # BLD: 0.1 K/UL (ref 0.1–1.3)
MONOCYTES NFR BLD: 1 % (ref 4–12)
NEUTS SEG # BLD: 5.8 K/UL (ref 1.7–8.2)
NEUTS SEG NFR BLD: 80 % (ref 43–78)
NRBC # BLD: 0 K/UL (ref 0–0.2)
NT PRO BNP: 644 PG/ML (ref 5–125)
PLATELET # BLD AUTO: 274 K/UL (ref 150–450)
PMV BLD AUTO: 10.1 FL (ref 9.4–12.3)
POTASSIUM SERPL-SCNC: 4.1 MMOL/L (ref 3.5–5.1)
PROCALCITONIN SERPL-MCNC: <0.05 NG/ML (ref 0–0.49)
PROT SERPL-MCNC: 7.6 G/DL (ref 6.3–8.2)
RBC # BLD AUTO: 4.49 M/UL (ref 4.05–5.2)
SODIUM SERPL-SCNC: 140 MMOL/L (ref 133–143)
TROPONIN I SERPL HS-MCNC: 20.2 PG/ML (ref 0–14)
WBC # BLD AUTO: 7.2 K/UL (ref 4.3–11.1)

## 2022-12-27 PROCEDURE — 93005 ELECTROCARDIOGRAM TRACING: CPT | Performed by: EMERGENCY MEDICINE

## 2022-12-27 PROCEDURE — 85379 FIBRIN DEGRADATION QUANT: CPT

## 2022-12-27 PROCEDURE — 94640 AIRWAY INHALATION TREATMENT: CPT

## 2022-12-27 PROCEDURE — 80053 COMPREHEN METABOLIC PANEL: CPT

## 2022-12-27 PROCEDURE — 6360000002 HC RX W HCPCS: Performed by: EMERGENCY MEDICINE

## 2022-12-27 PROCEDURE — 84145 PROCALCITONIN (PCT): CPT

## 2022-12-27 PROCEDURE — 83880 ASSAY OF NATRIURETIC PEPTIDE: CPT

## 2022-12-27 PROCEDURE — 71260 CT THORAX DX C+: CPT | Performed by: EMERGENCY MEDICINE

## 2022-12-27 PROCEDURE — 85025 COMPLETE CBC W/AUTO DIFF WBC: CPT

## 2022-12-27 PROCEDURE — 6360000004 HC RX CONTRAST MEDICATION: Performed by: EMERGENCY MEDICINE

## 2022-12-27 PROCEDURE — 96374 THER/PROPH/DIAG INJ IV PUSH: CPT

## 2022-12-27 PROCEDURE — 99285 EMERGENCY DEPT VISIT HI MDM: CPT

## 2022-12-27 PROCEDURE — 71045 X-RAY EXAM CHEST 1 VIEW: CPT

## 2022-12-27 PROCEDURE — 2580000003 HC RX 258: Performed by: EMERGENCY MEDICINE

## 2022-12-27 PROCEDURE — 83735 ASSAY OF MAGNESIUM: CPT

## 2022-12-27 PROCEDURE — 84484 ASSAY OF TROPONIN QUANT: CPT

## 2022-12-27 RX ORDER — ONDANSETRON 2 MG/ML
4 INJECTION INTRAMUSCULAR; INTRAVENOUS EVERY 6 HOURS PRN
Status: CANCELLED | OUTPATIENT
Start: 2022-12-27

## 2022-12-27 RX ORDER — AZITHROMYCIN 250 MG/1
TABLET, FILM COATED ORAL
Status: ON HOLD | COMMUNITY
Start: 2022-12-23 | End: 2022-12-28 | Stop reason: HOSPADM

## 2022-12-27 RX ORDER — METHYLPREDNISOLONE SODIUM SUCCINATE 125 MG/2ML
125 INJECTION, POWDER, LYOPHILIZED, FOR SOLUTION INTRAMUSCULAR; INTRAVENOUS
Status: COMPLETED | OUTPATIENT
Start: 2022-12-27 | End: 2022-12-27

## 2022-12-27 RX ORDER — ENOXAPARIN SODIUM 100 MG/ML
30 INJECTION SUBCUTANEOUS 2 TIMES DAILY
Status: CANCELLED | OUTPATIENT
Start: 2022-12-27

## 2022-12-27 RX ORDER — POLYETHYLENE GLYCOL 3350 17 G/17G
17 POWDER, FOR SOLUTION ORAL DAILY PRN
Status: CANCELLED | OUTPATIENT
Start: 2022-12-27

## 2022-12-27 RX ORDER — 0.9 % SODIUM CHLORIDE 0.9 %
100 INTRAVENOUS SOLUTION INTRAVENOUS ONCE
Status: COMPLETED | OUTPATIENT
Start: 2022-12-27 | End: 2022-12-27

## 2022-12-27 RX ORDER — ONDANSETRON 4 MG/1
4 TABLET, ORALLY DISINTEGRATING ORAL EVERY 8 HOURS PRN
Status: CANCELLED | OUTPATIENT
Start: 2022-12-27

## 2022-12-27 RX ORDER — SODIUM CHLORIDE 0.9 % (FLUSH) 0.9 %
5-40 SYRINGE (ML) INJECTION PRN
Status: CANCELLED | OUTPATIENT
Start: 2022-12-27

## 2022-12-27 RX ORDER — GUAIFENESIN 600 MG/1
600 TABLET, EXTENDED RELEASE ORAL 2 TIMES DAILY
Status: DISCONTINUED | OUTPATIENT
Start: 2022-12-27 | End: 2022-12-28 | Stop reason: HOSPADM

## 2022-12-27 RX ORDER — ACETAMINOPHEN 325 MG/1
650 TABLET ORAL EVERY 6 HOURS PRN
Status: CANCELLED | OUTPATIENT
Start: 2022-12-27

## 2022-12-27 RX ORDER — SODIUM CHLORIDE 0.9 % (FLUSH) 0.9 %
5-40 SYRINGE (ML) INJECTION EVERY 12 HOURS SCHEDULED
Status: CANCELLED | OUTPATIENT
Start: 2022-12-27

## 2022-12-27 RX ORDER — DEXAMETHASONE SODIUM PHOSPHATE 10 MG/ML
6 INJECTION INTRAMUSCULAR; INTRAVENOUS DAILY
Status: DISCONTINUED | OUTPATIENT
Start: 2022-12-28 | End: 2022-12-28 | Stop reason: HOSPADM

## 2022-12-27 RX ORDER — CELECOXIB 200 MG/1
200 CAPSULE ORAL 2 TIMES DAILY
Status: CANCELLED | OUTPATIENT
Start: 2022-12-27

## 2022-12-27 RX ORDER — ACETAMINOPHEN 650 MG/1
650 SUPPOSITORY RECTAL EVERY 6 HOURS PRN
Status: CANCELLED | OUTPATIENT
Start: 2022-12-27

## 2022-12-27 RX ORDER — PREDNISONE 20 MG/1
TABLET ORAL
Status: ON HOLD | COMMUNITY
Start: 2022-12-23 | End: 2022-12-28 | Stop reason: HOSPADM

## 2022-12-27 RX ORDER — SODIUM CHLORIDE 9 MG/ML
INJECTION, SOLUTION INTRAVENOUS PRN
Status: CANCELLED | OUTPATIENT
Start: 2022-12-27

## 2022-12-27 RX ORDER — PANTOPRAZOLE SODIUM 40 MG/1
40 TABLET, DELAYED RELEASE ORAL
Status: CANCELLED | OUTPATIENT
Start: 2022-12-28

## 2022-12-27 RX ORDER — SODIUM CHLORIDE 0.9 % (FLUSH) 0.9 %
10 SYRINGE (ML) INJECTION
Status: COMPLETED | OUTPATIENT
Start: 2022-12-27 | End: 2022-12-27

## 2022-12-27 RX ORDER — ALBUTEROL SULFATE 2.5 MG/3ML
2.5 SOLUTION RESPIRATORY (INHALATION) EVERY 4 HOURS
Status: DISCONTINUED | OUTPATIENT
Start: 2022-12-27 | End: 2022-12-28 | Stop reason: HOSPADM

## 2022-12-27 RX ORDER — IPRATROPIUM BROMIDE AND ALBUTEROL SULFATE 2.5; .5 MG/3ML; MG/3ML
1 SOLUTION RESPIRATORY (INHALATION)
Status: DISCONTINUED | OUTPATIENT
Start: 2022-12-27 | End: 2022-12-27

## 2022-12-27 RX ORDER — ZOLPIDEM TARTRATE 5 MG/1
10 TABLET ORAL NIGHTLY PRN
Status: CANCELLED | OUTPATIENT
Start: 2022-12-28

## 2022-12-27 RX ORDER — FLUOXETINE HYDROCHLORIDE 20 MG/1
40 CAPSULE ORAL DAILY
Status: CANCELLED | OUTPATIENT
Start: 2022-12-28

## 2022-12-27 RX ADMIN — METHYLPREDNISOLONE SODIUM SUCCINATE 125 MG: 125 INJECTION, POWDER, FOR SOLUTION INTRAMUSCULAR; INTRAVENOUS at 21:56

## 2022-12-27 RX ADMIN — ALBUTEROL SULFATE 2.5 MG: 2.5 SOLUTION RESPIRATORY (INHALATION) at 21:50

## 2022-12-27 RX ADMIN — IOPAMIDOL 100 ML: 755 INJECTION, SOLUTION INTRAVENOUS at 20:36

## 2022-12-27 RX ADMIN — SODIUM CHLORIDE, PRESERVATIVE FREE 10 ML: 5 INJECTION INTRAVENOUS at 20:37

## 2022-12-27 RX ADMIN — SODIUM CHLORIDE 100 ML: 9 INJECTION, SOLUTION INTRAVENOUS at 20:37

## 2022-12-27 ASSESSMENT — ENCOUNTER SYMPTOMS
SHORTNESS OF BREATH: 1
ABDOMINAL PAIN: 0
VOMITING: 0
CHEST TIGHTNESS: 1
COUGH: 1
NAUSEA: 0
WHEEZING: 1

## 2022-12-27 ASSESSMENT — PAIN - FUNCTIONAL ASSESSMENT
PAIN_FUNCTIONAL_ASSESSMENT: 0-10
PAIN_FUNCTIONAL_ASSESSMENT: PREVENTS OR INTERFERES SOME ACTIVE ACTIVITIES AND ADLS

## 2022-12-27 ASSESSMENT — PAIN DESCRIPTION - DESCRIPTORS: DESCRIPTORS: TIGHTNESS;DISCOMFORT

## 2022-12-27 ASSESSMENT — PAIN SCALES - GENERAL: PAINLEVEL_OUTOF10: 8

## 2022-12-27 ASSESSMENT — PAIN DESCRIPTION - PAIN TYPE: TYPE: ACUTE PAIN

## 2022-12-27 ASSESSMENT — PAIN DESCRIPTION - ONSET: ONSET: GRADUAL

## 2022-12-27 ASSESSMENT — PAIN DESCRIPTION - FREQUENCY: FREQUENCY: CONTINUOUS

## 2022-12-27 ASSESSMENT — PAIN DESCRIPTION - LOCATION: LOCATION: GENERALIZED;CHEST

## 2022-12-27 NOTE — ED TRIAGE NOTES
Positive for COVID today. Symptoms started on Friday. Antibiotics/steroids no relief. Pt has heart issues and chest feels tight.

## 2022-12-28 ENCOUNTER — HOSPITAL ENCOUNTER (INPATIENT)
Age: 52
LOS: 1 days | Discharge: HOME | DRG: 178 | End: 2022-12-28
Attending: INTERNAL MEDICINE | Admitting: INTERNAL MEDICINE

## 2022-12-28 VITALS
RESPIRATION RATE: 18 BRPM | TEMPERATURE: 97.9 F | SYSTOLIC BLOOD PRESSURE: 117 MMHG | DIASTOLIC BLOOD PRESSURE: 63 MMHG | OXYGEN SATURATION: 93 % | HEART RATE: 55 BPM

## 2022-12-28 DIAGNOSIS — Z09 HOSPITAL DISCHARGE FOLLOW-UP: Primary | ICD-10-CM

## 2022-12-28 LAB
EKG ATRIAL RATE: 63 BPM
EKG DIAGNOSIS: NORMAL
EKG P AXIS: 54 DEGREES
EKG P-R INTERVAL: 130 MS
EKG Q-T INTERVAL: 402 MS
EKG QRS DURATION: 76 MS
EKG QTC CALCULATION (BAZETT): 411 MS
EKG R AXIS: 40 DEGREES
EKG T AXIS: 50 DEGREES
EKG VENTRICULAR RATE: 63 BPM

## 2022-12-28 PROCEDURE — 6360000002 HC RX W HCPCS: Performed by: FAMILY MEDICINE

## 2022-12-28 PROCEDURE — 1100000000 HC RM PRIVATE

## 2022-12-28 PROCEDURE — 2580000003 HC RX 258: Performed by: INTERNAL MEDICINE

## 2022-12-28 PROCEDURE — 6370000000 HC RX 637 (ALT 250 FOR IP): Performed by: FAMILY MEDICINE

## 2022-12-28 PROCEDURE — 6370000000 HC RX 637 (ALT 250 FOR IP): Performed by: INTERNAL MEDICINE

## 2022-12-28 RX ORDER — METOPROLOL TARTRATE 50 MG/1
50 TABLET, FILM COATED ORAL 2 TIMES DAILY
Status: DISCONTINUED | OUTPATIENT
Start: 2022-12-28 | End: 2022-12-28 | Stop reason: HOSPADM

## 2022-12-28 RX ORDER — POLYETHYLENE GLYCOL 3350 17 G/17G
17 POWDER, FOR SOLUTION ORAL DAILY PRN
Status: DISCONTINUED | OUTPATIENT
Start: 2022-12-28 | End: 2022-12-28 | Stop reason: HOSPADM

## 2022-12-28 RX ORDER — PANTOPRAZOLE SODIUM 40 MG/1
40 TABLET, DELAYED RELEASE ORAL
Status: DISCONTINUED | OUTPATIENT
Start: 2022-12-28 | End: 2022-12-28 | Stop reason: HOSPADM

## 2022-12-28 RX ORDER — METOPROLOL TARTRATE 50 MG/1
50 TABLET, FILM COATED ORAL 2 TIMES DAILY
COMMUNITY

## 2022-12-28 RX ORDER — ONDANSETRON 4 MG/1
4 TABLET, ORALLY DISINTEGRATING ORAL EVERY 8 HOURS PRN
Status: DISCONTINUED | OUTPATIENT
Start: 2022-12-28 | End: 2022-12-28 | Stop reason: HOSPADM

## 2022-12-28 RX ORDER — ZOLPIDEM TARTRATE 5 MG/1
10 TABLET ORAL NIGHTLY PRN
Status: DISCONTINUED | OUTPATIENT
Start: 2022-12-28 | End: 2022-12-28 | Stop reason: HOSPADM

## 2022-12-28 RX ORDER — FLUOXETINE HYDROCHLORIDE 20 MG/1
40 CAPSULE ORAL DAILY
Status: DISCONTINUED | OUTPATIENT
Start: 2022-12-28 | End: 2022-12-28 | Stop reason: HOSPADM

## 2022-12-28 RX ORDER — ZOLPIDEM TARTRATE 5 MG/1
5 TABLET ORAL NIGHTLY PRN
Status: DISCONTINUED | OUTPATIENT
Start: 2022-12-28 | End: 2022-12-28

## 2022-12-28 RX ORDER — SODIUM CHLORIDE 9 MG/ML
INJECTION, SOLUTION INTRAVENOUS PRN
Status: DISCONTINUED | OUTPATIENT
Start: 2022-12-28 | End: 2022-12-28 | Stop reason: HOSPADM

## 2022-12-28 RX ORDER — SODIUM CHLORIDE 0.9 % (FLUSH) 0.9 %
5-40 SYRINGE (ML) INJECTION PRN
Status: DISCONTINUED | OUTPATIENT
Start: 2022-12-28 | End: 2022-12-28 | Stop reason: HOSPADM

## 2022-12-28 RX ORDER — AZITHROMYCIN 250 MG/1
250 TABLET, FILM COATED ORAL DAILY
Status: DISCONTINUED | OUTPATIENT
Start: 2022-12-28 | End: 2022-12-28 | Stop reason: HOSPADM

## 2022-12-28 RX ORDER — ZOLPIDEM TARTRATE 10 MG/1
10 TABLET ORAL NIGHTLY PRN
COMMUNITY

## 2022-12-28 RX ORDER — BENZONATATE 100 MG/1
100 CAPSULE ORAL 3 TIMES DAILY PRN
Qty: 30 CAPSULE | Refills: 0 | Status: SHIPPED | OUTPATIENT
Start: 2022-12-28 | End: 2023-01-07

## 2022-12-28 RX ORDER — ACETAMINOPHEN 650 MG/1
650 SUPPOSITORY RECTAL EVERY 6 HOURS PRN
Status: DISCONTINUED | OUTPATIENT
Start: 2022-12-28 | End: 2022-12-28 | Stop reason: HOSPADM

## 2022-12-28 RX ORDER — SODIUM CHLORIDE 0.9 % (FLUSH) 0.9 %
5-40 SYRINGE (ML) INJECTION EVERY 12 HOURS SCHEDULED
Status: DISCONTINUED | OUTPATIENT
Start: 2022-12-28 | End: 2022-12-28 | Stop reason: HOSPADM

## 2022-12-28 RX ORDER — ALBUTEROL SULFATE 90 UG/1
2 AEROSOL, METERED RESPIRATORY (INHALATION) 4 TIMES DAILY PRN
Qty: 54 G | Refills: 1 | Status: SHIPPED | OUTPATIENT
Start: 2022-12-28

## 2022-12-28 RX ORDER — DEXAMETHASONE 6 MG/1
6 TABLET ORAL DAILY
Qty: 8 TABLET | Refills: 0 | Status: SHIPPED | OUTPATIENT
Start: 2022-12-29 | End: 2023-01-06

## 2022-12-28 RX ORDER — ENOXAPARIN SODIUM 100 MG/ML
30 INJECTION SUBCUTANEOUS 2 TIMES DAILY
Status: DISCONTINUED | OUTPATIENT
Start: 2022-12-28 | End: 2022-12-28 | Stop reason: HOSPADM

## 2022-12-28 RX ORDER — CLONAZEPAM 0.5 MG/1
1 TABLET ORAL 2 TIMES DAILY
Status: DISCONTINUED | OUTPATIENT
Start: 2022-12-28 | End: 2022-12-28 | Stop reason: HOSPADM

## 2022-12-28 RX ORDER — ONDANSETRON 2 MG/ML
4 INJECTION INTRAMUSCULAR; INTRAVENOUS EVERY 6 HOURS PRN
Status: DISCONTINUED | OUTPATIENT
Start: 2022-12-28 | End: 2022-12-28 | Stop reason: HOSPADM

## 2022-12-28 RX ORDER — ACETAMINOPHEN 325 MG/1
650 TABLET ORAL EVERY 6 HOURS PRN
Status: DISCONTINUED | OUTPATIENT
Start: 2022-12-28 | End: 2022-12-28 | Stop reason: HOSPADM

## 2022-12-28 RX ORDER — GUAIFENESIN 600 MG/1
600 TABLET, EXTENDED RELEASE ORAL 2 TIMES DAILY
Qty: 28 TABLET | Refills: 0 | Status: SHIPPED | OUTPATIENT
Start: 2022-12-28 | End: 2023-01-11

## 2022-12-28 RX ORDER — DEXAMETHASONE 4 MG/1
6 TABLET ORAL DAILY
Status: DISCONTINUED | OUTPATIENT
Start: 2022-12-28 | End: 2022-12-28 | Stop reason: HOSPADM

## 2022-12-28 RX ADMIN — ACETAMINOPHEN 650 MG: 325 TABLET ORAL at 08:55

## 2022-12-28 RX ADMIN — GUAIFENESIN 600 MG: 600 TABLET ORAL at 01:21

## 2022-12-28 RX ADMIN — DEXAMETHASONE 6 MG: 4 TABLET ORAL at 08:55

## 2022-12-28 RX ADMIN — METOPROLOL TARTRATE 50 MG: 50 TABLET ORAL at 08:56

## 2022-12-28 RX ADMIN — GUAIFENESIN 600 MG: 600 TABLET ORAL at 08:55

## 2022-12-28 RX ADMIN — FLUOXETINE 40 MG: 20 CAPSULE ORAL at 08:55

## 2022-12-28 RX ADMIN — SODIUM CHLORIDE, PRESERVATIVE FREE 10 ML: 5 INJECTION INTRAVENOUS at 09:04

## 2022-12-28 RX ADMIN — CLONAZEPAM 1 MG: 0.5 TABLET ORAL at 08:56

## 2022-12-28 RX ADMIN — PANTOPRAZOLE SODIUM 40 MG: 40 TABLET, DELAYED RELEASE ORAL at 08:56

## 2022-12-28 ASSESSMENT — PAIN SCALES - GENERAL
PAINLEVEL_OUTOF10: 3
PAINLEVEL_OUTOF10: 0

## 2022-12-28 NOTE — PROGRESS NOTES
To whom it may concern,       Moncho Ingram may return to work on January 9, 2023. Thank you for your concern.         Chalino Thorne RN

## 2022-12-28 NOTE — PROGRESS NOTES
Patient arrived to room 801 via stretcher. Alert and oriented x 4. Respirations even and unlabored. On room air. No skin breakdown present. Radial and Pedal pulses felt bilaterally. No signs of distress. Oriented to room, call light. Safety measures in place.

## 2022-12-28 NOTE — CARE COORDINATION
Gianni Crook, met with patient in room 801 to discuss discharge planning. Patient lives alone in an apartment with 17 steps from entry. Patient is independent at baseline. Friend transports patient to appointments. Patient has no history of HH, rehab, or DME. Patient with discharge orders for today. No needs made known to CM. Patient has met all treatment goals and milestones for discharge. Friend to transport patient home. CM following until patient is discharged. 12/28/22 2521   Service Assessment   Patient Orientation Alert and Oriented   Cognition Alert   History Provided By Patient   Primary [address] is: Legal Next of Kin  (Daughter Baldemar Benito [phone])   PCP Verified by CM Yes  (Dr. Wil Lafleur)   Last Visit to PCP Within last 3 months   Prior Functional Level Independent in ADLs/IADLs   Current Functional Level Independent in ADLs/IADLs   Can patient return to prior living arrangement Yes   Ability to make needs known: Good   Family able to assist with home care needs: No   Would you like for me to discuss the discharge plan with any other family members/significant others, and if so, who?  No   Financial Resources None   [employer] None   Social/Functional History   Lives With Alone   Type of Home Apartment   Home Layout Two level   Home Access Stairs to enter with rails   Entrance Stairs - Number of Steps 17   Receives Help From Friend(s)   ADL Assistance Independent   Homemaking Assistance Independent   Ambulation Assistance Independent   Transfer Assistance Independent   Active  No  (Has license but does not have car)   Patient's  Info Friend transports   Occupation Full time employment   Type of Occupation [address] [place] [address]   Discharge Planning   Type of [address] Prior To Admission None   Potential Assistance Needed N/A   DME Ordered? No   Potential Assistance Purchasing Medications Yes   Type of Home Care Services None   Patient expects to be discharged to: Apartment   One/Two Story Residence Two story   Lift Chair Available No   Services At/After Discharge   Transition of Care Consult (CM Consult) [address] Discharge None   [employer] Information Provided? No   Mode of Transport at Discharge Self   Confirm Follow Up Transport Self   Condition of Participation: Discharge Planning   The Plan for Transition of Care is related to the following treatment goals: Return to baseline with support from friends   The Patient and/or Patient Representative was provided with a Choice of Provider? Patient   The Patient and/Or Patient Representative agree with the Discharge Plan? Yes   Freedom of Choice list was provided with basic dialogue that supports the patient's individualized plan of care/goals, treatment preferences, and shares the quality data associated with the providers?   Yes

## 2022-12-28 NOTE — ED PROVIDER NOTES
Emergency Department Provider Note                   PCP:                Sheree Mandel MD               Age: 46 y.o. Sex: female       ICD-10-CM    1. COVID-19 virus infection  U07.1       2. Acute bronchospasm  J98.01           DISPOSITION Decision To Transfer 12/27/2022 09:30:44 PM        MDM  Number of Diagnoses or Management Options  Acute bronchospasm: new, needed workup  COVID-19 virus infection: new, needed workup     Amount and/or Complexity of Data Reviewed  Clinical lab tests: ordered and reviewed  Tests in the radiology section of CPT®: ordered and reviewed  Tests in the medicine section of CPT®: ordered and reviewed (ECG interpretation for ECG dated 27 December 2022 at 6:19 PM: ECG reveals a normal sinus rhythm rate of 63 bpm with normal VA and QTc intervals and normal axis. No evidence of acute ischemic change. Normal ECG. Andressa Mayen MD  )  Review and summarize past medical records: yes  Discuss the patient with other providers: yes  Independent visualization of images, tracings, or specimens: yes    Risk of Complications, Morbidity, and/or Mortality  Presenting problems: high  Diagnostic procedures: moderate  Management options: moderate  General comments: Patient given Solu-Medrol and DuoNeb on presentation with mild to moderate improvement in her symptoms. Due to elevated BNP as well as troponin, in the face of current COVID infection, the patient was screened with CT of the chest for possible PE.  CT negative for PE but positive for some bronchial plugging consistent with bronchitis. On recheck the patient was still tachypneic with wheezes throughout. She will be given another albuterol treatment and the case will be discussed with the hospitalist.    Patient Progress  Patient progress: improved     Complexity of Problem:1 acute or chronic illness that poses a threat to life or bodily function.  (5)  The patients assessment required an independent historian: I spoke with a caregiver. I have conducted an independent ordering and review of Labs. I have conducted an independent ordering and review of EKG. I have conducted an independent ordering and review of X-rays. I have conducted an independent ordering and review of CT Scan. Patient was admitted I have communication with admitting physician. Orders Placed This Encounter   Procedures    XR CHEST PORTABLE    CT CHEST PULMONARY EMBOLISM W CONTRAST    CBC with Auto Differential    Comprehensive Metabolic Panel    Magnesium    Troponin    Brain Natriuretic Peptide    D-Dimer, Quantitative    Continuous Pulse Oximetry    Cardiac Monitor - ED Only    EKG 12 Lead    Saline lock IV        Medications   ipratropium-albuterol (DUONEB) nebulizer solution 1 ampule (1 ampule Inhalation Not Given 12/27/22 1859)   methylPREDNISolone sodium (SOLU-MEDROL) injection 125 mg (has no administration in time range)   albuterol (PROVENTIL) nebulizer solution 2.5 mg (has no administration in time range)       New Prescriptions    No medications on file        Binh Chen is a 46 y.o. female who presents to the Emergency Department with chief complaint of    Chief Complaint   Patient presents with    Positive For Covid-19     Positive for COVID today. Symptoms started on Friday. Antibiotics/steroids no relief. 54-year-old female who does vape but denies cigarette use with history of anxiety and bicuspid aortic valve presents to the emergency department with URI symptoms over the last 5 days with progressively worsening respiratory distress. Patient was seen by her family doctor on Friday, 5 days ago, tested for COVID which was negative and placed on a Z-Donald as well as prednisone. She finished her prednisone this morning was back in at the doctor's office with increased respiratory distress and increased wheezing, received more steroids as well as a breathing treatment in the office with moderate improvement.   She was went home, and progressively became more short of breath. She also complains of chest tightness which is worse with the cough. She denies any nausea or vomiting. The history is provided by the patient. Review of Systems   Constitutional:  Positive for fatigue. Negative for chills and fever. HENT:  Positive for congestion. Respiratory:  Positive for cough, chest tightness, shortness of breath and wheezing. Cardiovascular:  Negative for palpitations and leg swelling. Gastrointestinal:  Negative for abdominal pain, nausea and vomiting. Genitourinary:  Negative for dysuria and frequency. All other systems reviewed and are negative.     Past Medical History:   Diagnosis Date    Anxiety     Bicuspid aortic valve     Primary osteoarthritis of both hips     RLS (restless legs syndrome)         Past Surgical History:   Procedure Laterality Date    ACHILLES TENDON SURGERY  2017    OTHER SURGICAL HISTORY      herniated belly button    PARTIAL HYSTERECTOMY (CERVIX NOT REMOVED)  2014    only right ovary removed    TONSILLECTOMY          Family History   Problem Relation Age of Onset    Stroke Mother     High Blood Pressure Mother     Uterine Cancer Mother     No Known Problems Father     Hypertension Maternal Grandmother     Diabetes Maternal Grandmother     Breast Cancer Maternal Aunt         Social History     Socioeconomic History    Marital status:     Number of children: 3   Occupational History    Occupation: medical assistant   Tobacco Use    Smoking status: Former     Types: Cigarettes     Quit date: 2020     Years since quittin.5    Smokeless tobacco: Never   Vaping Use    Vaping Use: Never used   Substance and Sexual Activity    Alcohol use: Yes     Comment: socially    Drug use: Never    Sexual activity: Yes     Partners: Male         Latex, Bee venom, Penicillin g, and Other     Previous Medications    AZITHROMYCIN (ZITHROMAX) 250 MG TABLET    TAKE 2 TABLETS BY MOUTH TODAY, THEN TAKE 1 TABLET DAILY FOR 4 DAYS    B COMPLEX VITAMINS CAPSULE    Take 1 capsule by mouth daily    CELECOXIB (CELEBREX) 200 MG CAPSULE    Celebrex 200 mg capsule  Take 1 capsule twice a day by oral route. CLONAZEPAM (KLONOPIN) 1 MG TABLET    Take 1 tablet by mouth in the morning and at bedtime for 30 days. EPINEPHRINE (EPIPEN 2-IDALIA) 0.3 MG/0.3ML SOAJ INJECTION    Inject 0.3 mLs into the skin once for 1 dose Use as directed for allergic reaction    FLUOXETINE (PROZAC) 40 MG CAPSULE    Take 1 capsule by mouth daily    METOPROLOL SUCCINATE 50 MG CS24    Take 1 tablet by mouth in the morning and at bedtime    OMEPRAZOLE (PRILOSEC OTC) 20 MG TABLET    omeprazole 20 mg tablet,delayed release  Take 1 tablet every day by oral route. ORLISTAT (XENICAL) 120 MG CAPSULE    Take 1 capsule by mouth 3 times daily (with meals)    PRAMIPEXOLE (MIRAPEX) 1 MG TABLET    Mirapex 1 mg tablet  Take 1 tablet every day by oral route. PREDNISONE (DELTASONE) 20 MG TABLET    TAKE 1 TABLET BY MOUTH EVERY DAY FOR 5 DAYS        Vitals signs and nursing note reviewed. Patient Vitals for the past 4 hrs:   Temp Pulse Resp BP SpO2   12/27/22 1947 -- 56 20 130/70 99 %   12/27/22 1813 98.3 °F (36.8 °C) 59 22 139/88 98 %          Physical Exam  Vitals and nursing note reviewed. Constitutional:       General: She is in acute distress. Appearance: She is obese. HENT:      Head: Normocephalic and atraumatic. Right Ear: Tympanic membrane normal.      Left Ear: Tympanic membrane normal.      Nose: Nose normal.      Mouth/Throat:      Mouth: Mucous membranes are moist.   Eyes:      Extraocular Movements: Extraocular movements intact. Conjunctiva/sclera: Conjunctivae normal.      Pupils: Pupils are equal, round, and reactive to light. Cardiovascular:      Rate and Rhythm: Normal rate and regular rhythm. Heart sounds: No murmur heard.   Pulmonary:      Effort: Pulmonary effort is normal.      Breath sounds: Wheezing (All fields) present. No rhonchi or rales. Abdominal:      General: Abdomen is flat. Palpations: There is no mass. Tenderness: There is no abdominal tenderness. There is no right CVA tenderness or left CVA tenderness. Musculoskeletal:         General: Normal range of motion. Cervical back: Normal range of motion and neck supple. Right lower leg: No edema. Left lower leg: No edema. Skin:     General: Skin is warm and dry. Neurological:      General: No focal deficit present. Mental Status: She is alert and oriented to person, place, and time. Psychiatric:         Mood and Affect: Mood and affect normal.         Speech: Speech normal.        Procedures    Results for orders placed or performed during the hospital encounter of 12/27/22   XR CHEST PORTABLE    Narrative    EXAM: Single view chest radiograph. INDICATION: Covid 19 positive, chest tightness. COMPARISON: Chest radiograph dated December 09, 2021. FINDINGS:  No focal lung consolidation. No pneumothorax. No pleural effusion. The heart is  normal in size. No evidence of acute osseous abnormality. Nipple piercings  evident. Impression    1. No acute cardiopulmonary abnormality. CT CHEST PULMONARY EMBOLISM W CONTRAST    Narrative    EXAMINATION: CT CHEST PULMONARY EMBOLISM W CONTRAST 12/27/2022 8:46 PM    ACCESSION NUMBER: [id number]    COMPARISON: None available    INDICATION: covid 19, SOB    TECHNIQUE: Multiple contiguous axial CT images of the chest were obtained from  the lung apices to the lung bases after the intravenous administration of 100mL  Iso-anaya 370 per pulmonary angiography protocol. Coronal reconstructions were  performed. Radiation dose reduction techniques were used for this study. Our CT scanners  use one or all of the following: Automated exposure control, adjustment of the  mA and/or kV according to patient size, iterative reconstruction.     FINDINGS:  STUDY QUALITY: The exam study quality is good. AIRWAYS: Subsegmental mucous plugging within the lower lobes. Mild bronchial  wall thickening. Central airways are otherwise patent. LUNGS: No airspace consolidation or groundglass. No suspicious pulmonary  nodules. PLEURA: No pleural effusion or pneumothorax. HEART: The heart is not enlarged. No calcified coronary atherosclerosis. No  pericardial effusion. THORACIC AORTA: The aorta is normal in caliber. PULMONARY ARTERY: No pulmonary embolus to the segmental level. The main  pulmonary artery is normal in caliber. MEDIASTINUM/JOSH: No mediastinal mass or lymphadenopathy. CHEST WALL: No mass or axillary lymphadenopathy. UPPER ABDOMEN: The visualized upper abdomen is unremarkable. BONES: No suspicious osseous lesion. Impression    1. No pulmonary embolus. 2.  Mild lower lobe predominant bronchial wall thickening and subsegmental  mucous plugging, nonspecific but could represent sequela of bronchitis.        CBC with Auto Differential   Result Value Ref Range    WBC 7.2 4.3 - 11.1 K/uL    RBC 4.49 4.05 - 5.2 M/uL    Hemoglobin 13.3 11.7 - 15.4 g/dL    Hematocrit 40.2 35.8 - 46.3 %    MCV 89.5 82.0 - 102.0 FL    MCH 29.6 26.1 - 32.9 PG    MCHC 33.1 31.4 - 35.0 g/dL    RDW 13.1 11.9 - 14.6 %    Platelets 756 171 - 335 K/uL    MPV 10.1 9.4 - 12.3 FL    nRBC 0.00 0.0 - 0.2 K/uL    Differential Type AUTOMATED      Seg Neutrophils 80 (H) 43 - 78 %    Lymphocytes 18 13 - 44 %    Monocytes 1 (L) 4.0 - 12.0 %    Eosinophils % 0 (L) 0.5 - 7.8 %    Basophils 0 0.0 - 2.0 %    Immature Granulocytes 0 0.0 - 5.0 %    Segs Absolute 5.8 1.7 - 8.2 K/UL    Absolute Lymph # 1.3 0.5 - 4.6 K/UL    Absolute Mono # 0.1 0.1 - 1.3 K/UL    Absolute Eos # 0.0 0.0 - 0.8 K/UL    Basophils Absolute 0.0 0.0 - 0.2 K/UL    Absolute Immature Granulocyte 0.0 0.0 - 0.5 K/UL   Comprehensive Metabolic Panel   Result Value Ref Range    Sodium 140 133 - 143 mmol/L    Potassium 4.1 3.5 - 5.1 mmol/L Chloride 106 101 - 110 mmol/L    CO2 24 21 - 32 mmol/L    Anion Gap 10 2 - 11 mmol/L    Glucose 167 (H) 65 - 100 mg/dL    BUN 22 6 - 23 MG/DL    Creatinine 0.91 0.6 - 1.0 MG/DL    Est, Glom Filt Rate >60 >60 ml/min/1.73m2    Calcium 9.2 8.3 - 10.4 MG/DL    Total Bilirubin 0.3 0.2 - 1.1 MG/DL    ALT 36 12 - 65 U/L    AST 15 15 - 37 U/L    Alk Phosphatase 85 50 - 130 U/L    Total Protein 7.6 6.3 - 8.2 g/dL    Albumin 3.6 3.5 - 5.0 g/dL    Globulin 4.0 2.8 - 4.5 g/dL    Albumin/Globulin Ratio 0.9 0.4 - 1.6     Magnesium   Result Value Ref Range    Magnesium 2.1 1.8 - 2.4 mg/dL   Troponin   Result Value Ref Range    Troponin, High Sensitivity 20.2 (H) 0 - 14 pg/mL   Brain Natriuretic Peptide   Result Value Ref Range    NT Pro- (H) 5 - 125 PG/ML   D-Dimer, Quantitative   Result Value Ref Range    D-Dimer, Quant 0.42 <0.56 ug/ml(FEU)   EKG 12 Lead   Result Value Ref Range    Ventricular Rate 63 BPM    Atrial Rate 63 BPM    P-R Interval 130 ms    QRS Duration 76 ms    Q-T Interval 402 ms    QTc Calculation (Bazett) 411 ms    P Axis 54 degrees    R Axis 40 degrees    T Axis 50 degrees    Diagnosis Normal sinus rhythm         CT CHEST PULMONARY EMBOLISM W CONTRAST   Final Result   1. No pulmonary embolus. 2.  Mild lower lobe predominant bronchial wall thickening and subsegmental   mucous plugging, nonspecific but could represent sequela of bronchitis. XR CHEST PORTABLE   Final Result   1. No acute cardiopulmonary abnormality. Voice dictation software was used during the making of this note. This software is not perfect and grammatical and other typographical errors may be present. This note has not been completely proofread for errors.      Ashutosh Silva MD  12/27/22 0698

## 2022-12-28 NOTE — ED NOTES
I have reviewed discharge instructions with the patient. The patient verbalized understanding. Patient left ED via Discharge Method: stretcher to [facility] with self and EMS. Opportunity for questions and clarification provided. Patient given 0 scripts. To continue your aftercare when you leave the hospital, you may receive an automated call from our care team to check in on how you are doing. This is a free service and part of our promise to provide the best care and service to meet your aftercare needs.  If you have questions, or wish to unsubscribe from this service please call [phone]. Thank you for Choosing our [facility] Emergency Department.         Barry Carey RN  12/27/22 5708

## 2022-12-28 NOTE — PROGRESS NOTES
TRANSFER - IN REPORT:    Verbal report received from ROSARIO Alfaro on Cheryle Coulter  being received from [facility] [place] [id number] [facility] ED for routine progression of patient care      Report consisted of patient's Situation, Background, Assessment and   Recommendations(SBAR). Information from the following report(s) ED Encounter Summary, ED SBAR, [facility], and Recent Results was reviewed with the receiving nurse. Opportunity for questions and clarification was provided.

## 2022-12-28 NOTE — ED NOTES
TRANSFER - OUT REPORT:    Verbal report given to [facility] RN on Kailey Vigil  being transferred to  [phone] DT for routine progression of patient care       Report consisted of patient's Situation, Background, Assessment and   Recommendations(SBAR). Information from the following report(s) ED SBAR was reviewed with the receiving nurse. Lines:   Peripheral IV 12/27/22 Right Antecubital (Active)   Site Assessment Clean, dry & intact 12/27/22 1940   Line Status Flushed;Brisk blood return 12/27/22 1940   Phlebitis Assessment No symptoms 12/27/22 1940   Infiltration Assessment 0 12/27/22 1940   Dressing Intervention New 12/27/22 1940        Opportunity for questions and clarification was provided.       Patient transported with:  Zandra Klein RN  12/27/22 7616

## 2022-12-28 NOTE — DISCHARGE SUMMARY
Hospitalist Discharge Summary   Admit Date:  2022 12:17 AM   DC Note date: 2022  Name:  Edwige Park   Age:  46 y.o. Sex:  female  :  1970   MRN:  [id number]   Room:  [facility]  PCP:  Luzma Clarke MD    Presenting Complaint: No chief complaint on file. Initial Admission Diagnosis: COVID [U07.1]     Problem List for this Hospitalization (present on admission):    Principal Problem:    COVID  Active Problems:    Bicuspid aortic valve    Generalized anxiety disorder    RLS (restless legs syndrome)    Class 3 severe obesity due to excess calories in adult [facility])    Demand ischemia (HCC)  Resolved Problems:    * No resolved hospital problems. *      Hospital Course:  Edwige Park is a 46 y.o. female with medical history of morbid obesity, HTN, RLS, and CRISTHIAN presents with a 5-day history of worsening productive cough and shortness of breath. She works in a physician office. She tested herself at home for [place] on Friday and  \"and it was negative both times. \" She was given azithromycin and steroids without much relief. ED work-up showed COVID-positive, CXR unrevealing. CT chest negative for PE. Given several rounds of jet nebs and steroids. Hospitalist consulted for admission. Patient admitted with COVID-19 infection/bronchitis. Started on nebs treatment and Decadron. Started on supportive care for COVID-19 infection. Procalcitonin negative and patient recently had completed Z-Donald. No further need of antibiotics. Mildly elevated troponin likely secondary to demand ischemia. Remained on room air. All other chronic conditions stable and we continued essential home meds. No new complaint on the day of discharge. Patient symptoms have been improved and stable to discharge home today with close follow-up with PCP. Oxygen qualifier as below.     Oxygen Qualifier          Room air: SpO2 with O2 and liter flow   Resting SpO2  97%     Ambulating SpO2  95%            Disposition: 12/27/22  1937   BILITOT 0.3   ALKPHOS 85   AST 15   ALT 36   PROT 7.6   LABALBU 3.6   GLOB 4.0        Cardiac  Lab Results   Component Value Date/Time    NTPROBNP 644 12/27/2022 07:37 PM    TROPHS 20.2 12/27/2022 07:37 PM      Coags No results found for: PROTIME, INR, APTT   A1c No results found for: LABA1C, EAG   Lipids No results found for: CHOL, LDLCALC, LABVLDL, HDL, CHOLHDLRATIO, TRIG   Thyroid  No results found for: [place] Monday     Most Recent UA No results found for: COLORU, APPEARANCE, SPECGRAV, LABPH, PROTEINU, GLUCOSEU, KETUA, BILIRUBINUR, BLOODU, UROBILINOGEN, NITRU, LEUKOCYTESUR, WBCUA, RBCUA, EPITHUA, BACTERIA, LABCAST, MUCUS     No results for input(s): CULTURE in the last 720 hours.     All Labs from Last 24 Hrs:  Recent Results (from the past 24 hour(s))   EKG 12 Lead    Collection Time: 12/27/22  6:19 PM   Result Value Ref Range    Ventricular Rate 63 BPM    Atrial Rate 63 BPM    P-R Interval 130 ms    QRS Duration 76 ms    Q-T Interval 402 ms    QTc Calculation (Bazett) 411 ms    P Axis 54 degrees    R Axis 40 degrees    T Axis 50 degrees    Diagnosis Normal sinus rhythm    CBC with Auto Differential    Collection Time: 12/27/22  7:37 PM   Result Value Ref Range    WBC 7.2 4.3 - 11.1 K/uL    RBC 4.49 4.05 - 5.2 M/uL    Hemoglobin 13.3 11.7 - 15.4 g/dL    Hematocrit 40.2 35.8 - 46.3 %    MCV 89.5 82.0 - 102.0 FL    MCH 29.6 26.1 - 32.9 PG    MCHC 33.1 31.4 - 35.0 g/dL    RDW 13.1 11.9 - 14.6 %    Platelets 489 619 - 988 K/uL    MPV 10.1 9.4 - 12.3 FL    nRBC 0.00 0.0 - 0.2 K/uL    Differential Type AUTOMATED      Seg Neutrophils 80 (H) 43 - 78 %    Lymphocytes 18 13 - 44 %    Monocytes 1 (L) 4.0 - 12.0 %    Eosinophils % 0 (L) 0.5 - 7.8 %    Basophils 0 0.0 - 2.0 %    Immature Granulocytes 0 0.0 - 5.0 %    Segs Absolute 5.8 1.7 - 8.2 K/UL    Absolute Lymph # 1.3 0.5 - 4.6 K/UL    Absolute Mono # 0.1 0.1 - 1.3 K/UL    Absolute Eos # 0.0 0.0 - 0.8 K/UL    Basophils Absolute 0.0 0.0 - 0.2 K/UL Absolute Immature Granulocyte 0.0 0.0 - 0.5 K/UL   Comprehensive Metabolic Panel    Collection Time: 12/27/22  7:37 PM   Result Value Ref Range    Sodium 140 133 - 143 mmol/L    Potassium 4.1 3.5 - 5.1 mmol/L    Chloride 106 101 - 110 mmol/L    CO2 24 21 - 32 mmol/L    Anion Gap 10 2 - 11 mmol/L    Glucose 167 (H) 65 - 100 mg/dL    BUN 22 6 - 23 MG/DL    Creatinine 0.91 0.6 - 1.0 MG/DL    Est, Glom Filt Rate >60 >60 ml/min/1.73m2    Calcium 9.2 8.3 - 10.4 MG/DL    Total Bilirubin 0.3 0.2 - 1.1 MG/DL    ALT 36 12 - 65 U/L    AST 15 15 - 37 U/L    Alk Phosphatase 85 50 - 130 U/L    Total Protein 7.6 6.3 - 8.2 g/dL    Albumin 3.6 3.5 - 5.0 g/dL    Globulin 4.0 2.8 - 4.5 g/dL    Albumin/Globulin Ratio 0.9 0.4 - 1.6     Magnesium    Collection Time: 12/27/22  7:37 PM   Result Value Ref Range    Magnesium 2.1 1.8 - 2.4 mg/dL   Troponin    Collection Time: 12/27/22  7:37 PM   Result Value Ref Range    Troponin, High Sensitivity 20.2 (H) 0 - 14 pg/mL   Brain Natriuretic Peptide    Collection Time: 12/27/22  7:37 PM   Result Value Ref Range    NT Pro- (H) 5 - 125 PG/ML   D-Dimer, Quantitative    Collection Time: 12/27/22  7:37 PM   Result Value Ref Range    D-Dimer, Quant 0.42 <0.56 ug/ml(FEU)   Procalcitonin    Collection Time: 12/27/22  7:37 PM   Result Value Ref Range    Procalcitonin <0.05 0.00 - 0.49 ng/mL       Allergies   Allergen Reactions    Latex Rash and Other (See Comments)     itching    Bee Venom Anaphylaxis    Penicillin G Swelling     Throat swelling, difficulty breathing    Other      Immunization History   Administered Date(s) Administered    Tdap (Boostrix, Adacel) 01/01/2018       Recent Vital Data:  Patient Vitals for the past 24 hrs:   Temp Pulse Resp BP SpO2   12/28/22 1020 97.9 °F (36.6 °C) 55 18 117/63 93 %   12/28/22 0812 97.7 °F (36.5 °C) (!) 49 18 117/68 97 %   12/28/22 0438 97.5 °F (36.4 °C) 50 21 126/67 99 %   12/28/22 0040 -- 51 -- 133/62 --   12/28/22 0035 97.3 °F (36.3 °C) 51 20 133/62 98 %         Oxygen Therapy  SpO2: 93 %  O2 Device: None (Room air)    Estimated body mass index is 43.27 kg/m² as calculated from the following:    Height as of 12/27/22: 5' 5\" (1.651 m). Weight as of 12/27/22: 260 lb (117.9 kg). No intake or output data in the 24 hours ending 12/28/22 1044      Physical Exam:    General:    Well nourished. No overt distress  Head:  Normocephalic, atraumatic  Eyes:  Sclerae appear normal.  Pupils equally round. HENT:  Nares appear normal, no drainage. Moist mucous membranes  Neck:  No restricted ROM. Trachea midline  CV:   RRR. No m/r/g. No JVD  Lungs:   Occasional wheezing, no acute distress noted  Abdomen:   Soft, nontender, nondistended. Extremities: Warm and dry. No cyanosis or clubbing. No edema. Skin:     No rashes. Normal coloration  Neuro:  CN II-XII grossly intact. Psych:  Normal mood and affect. Signed:  Kye Alex MD    Part of this note may have been written by using a voice dictation software. The note has been proof read but may still contain some grammatical/other typographical errors.

## 2022-12-28 NOTE — ED NOTES
Hospitalist informed pt had Solumedrol, he stated she can receive the Decadron tomorrow instead of today.       Natalio Taylor RN  12/27/22 6640

## 2022-12-28 NOTE — PROGRESS NOTES
Hospitalist Discharge Summary   Admit Date:  2022 12:17 AM   DC Note date: 2022  Name:  Karen Charles   Age:  46 y.o. Sex:  female  :  1970   MRN:  [id number]   Room:  [facility]  PCP:  Jennifer Ricardo MD    Presenting Complaint: No chief complaint on file. Initial Admission Diagnosis: COVID [U07.1]     Problem List for this Hospitalization (present on admission):    Principal Problem:    COVID  Active Problems:    Bicuspid aortic valve    Generalized anxiety disorder    RLS (restless legs syndrome)    Class 3 severe obesity due to excess calories in [facility])    Demand ischemia (HCC)  Resolved Problems:    * No resolved hospital problems. *      Hospital Course:  Karen Charles is a 46 y.o. female with medical history of morbid obesity, HTN, RLS, and CRISTHIAN presents with a 5-day history of worsening productive cough and shortness of breath. She works in a physician office. She tested herself at home for [place] on Friday and  \"and it was negative both times. \" She was given azithromycin and steroids without much relief. ED work-up showed COVID-positive, CXR unrevealing. CT chest negative for PE. Given several rounds of jet nebs and steroids. Hospitalist consulted for admission. Patient admitted with COVID-19 infection/bronchitis. Started on nebs treatment and Decadron. Started on supportive care for COVID-19 infection. Procalcitonin negative and patient recently had completed Z-Donald. No further need of antibiotics. Mildly elevated troponin likely secondary to demand ischemia. Remained on room air. All other chronic conditions stable and we continued essential home meds. No new complaint on the day of discharge. Patient symptoms have been improved and stable to discharge home today with close follow-up with PCP. Oxygen qualifier as below.     Oxygen Qualifier          Room air: SpO2 with O2 and liter flow   Resting SpO2  97%     Ambulating SpO2  95%            Disposition: Home  Diet: ADULT DIET; Regular  Code Status: Full Code    Follow Ups:   Follow-up Information     Tracy Chandler Follow up on 1/3/2023. Why: 1/3/2023, Tuesday, at 10:30 AM  Contact information:  [facility] [address]  [address]  [phone]                       Time spent in patient discharge and coordination 38 minutes. Plan was discussed with patient. All questions answered. Patient was stable at time of discharge. Instructions given to call a physician or return if any concerns. Current Discharge Medication List        START taking these medications    Details   guaiFENesin (MUCINEX) 600 MG extended release tablet Take 1 tablet by mouth 2 times daily for 14 days  Qty: 28 tablet, Refills: 0      dexamethasone (DECADRON) 6 MG tablet Take 1 tablet by mouth daily for 8 doses  Qty: 8 tablet, Refills: 0      albuterol sulfate HFA (VENTOLIN HFA) 108 (90 Base) MCG/ACT inhaler Inhale 2 puffs into the lungs 4 times daily as needed for Wheezing  Qty: 54 g, Refills: 1      benzonatate (TESSALON) 100 MG capsule Take 1 capsule by mouth 3 times daily as needed for Cough  Qty: 30 capsule, Refills: 0           CONTINUE these medications which have NOT CHANGED    Details   metoprolol tartrate (LOPRESSOR) 50 MG tablet Take 50 mg by mouth 2 times daily      zolpidem (AMBIEN) 10 MG tablet Take 10 mg by mouth nightly as needed for Sleep. EPINEPHrine (EPIPEN 2-IDALIA) 0.3 MG/0.3ML SOAJ injection Inject 0.3 mLs into the skin once for 1 dose Use as directed for allergic reaction  Qty: 0.3 mL, Refills: 0    Associated Diagnoses: Bee sting allergy      b complex vitamins capsule Take 1 capsule by mouth daily      FLUoxetine (PROZAC) 40 MG capsule Take 1 capsule by mouth daily  Qty: 90 capsule, Refills: 1    Associated Diagnoses: Generalized anxiety disorder      clonazePAM (KLONOPIN) 1 MG tablet Take 1 tablet by mouth in the morning and at bedtime for 30 days.   Qty: 60 tablet, Refills: 0    Associated Diagnoses: Generalized anxiety disorder      celecoxib (CELEBREX) 200 MG capsule Celebrex 200 mg capsule  Take 1 capsule twice a day by oral route. Qty: 180 capsule, Refills: 1    Associated Diagnoses: Primary osteoarthritis of both hips      omeprazole (PRILOSEC OTC) 20 MG tablet omeprazole 20 mg tablet,delayed release  Take 1 tablet every day by oral route. Qty: 180 tablet, Refills: 1    Associated Diagnoses: Heartburn           STOP taking these medications       predniSONE (DELTASONE) 20 MG tablet Comments:   Reason for Stopping:         azithromycin (ZITHROMAX) 250 MG tablet Comments:   Reason for Stopping:         orlistat (XENICAL) 120 MG capsule Comments:   Reason for Stopping:         Metoprolol Succinate 50 MG CS24 Comments:   Reason for Stopping:         pramipexole (MIRAPEX) 1 MG tablet Comments:   Reason for Stopping:               Procedures done this admission:  * No surgery found *    Consults this admission:  None    Echocardiogram results:  No results found for this or any previous visit. Diagnostic Imaging/Tests:   XR CHEST PORTABLE    Result Date: 12/27/2022  1. No acute cardiopulmonary abnormality. CT CHEST PULMONARY EMBOLISM W CONTRAST    Result Date: 12/27/2022  1. No pulmonary embolus. 2.  Mild lower lobe predominant bronchial wall thickening and subsegmental mucous plugging, nonspecific but could represent sequela of bronchitis.         Labs: Results:       BMP, Mg, Phos Recent Labs     12/27/22 1937      K 4.1      CO2 24   ANIONGAP 10   BUN 22   CREATININE 0.91   LABGLOM >60   CALCIUM 9.2   GLUCOSE 167*   MG 2.1      CBC Recent Labs     12/27/22 1937   WBC 7.2   RBC 4.49   HGB 13.3   HCT 40.2   MCV 89.5   MCH 29.6   MCHC 33.1   RDW 13.1      MPV 10.1   NRBC 0.00   SEGS 80*   LYMPHOPCT 18   EOSRELPCT 0*   MONOPCT 1*   BASOPCT 0   IMMGRAN 0   SEGSABS 5.8   LYMPHSABS 1.3   EOSABS 0.0   MONOSABS 0.1   BASOSABS 0.0   ABSIMMGRAN 0.0      LFT Recent Labs 12/27/22  1937   BILITOT 0.3   ALKPHOS 85   AST 15   ALT 36   PROT 7.6   LABALBU 3.6   GLOB 4.0      Cardiac  Lab Results   Component Value Date/Time    NTPROBNP 644 12/27/2022 07:37 PM    TROPHS 20.2 12/27/2022 07:37 PM      Coags No results found for: PROTIME, INR, APTT   A1c No results found for: LABA1C, EAG   Lipids No results found for: CHOL, LDLCALC, LABVLDL, HDL, CHOLHDLRATIO, TRIG   Thyroid  No results found for: Donnice Humbles     Most Recent UA No results found for: COLORU, APPEARANCE, SPECGRAV, LABPH, PROTEINU, GLUCOSEU, KETUA, BILIRUBINUR, BLOODU, UROBILINOGEN, NITRU, LEUKOCYTESUR, WBCUA, RBCUA, EPITHUA, BACTERIA, LABCAST, MUCUS     No results for input(s): CULTURE in the last 720 hours.     All Labs from Last 24 Hrs:  Recent Results (from the past 24 hour(s))   EKG 12 Lead    Collection Time: 12/27/22  6:19 PM   Result Value Ref Range    Ventricular Rate 63 BPM    Atrial Rate 63 BPM    P-R Interval 130 ms    QRS Duration 76 ms    Q-T Interval 402 ms    QTc Calculation (Bazett) 411 ms    P Axis 54 degrees    R Axis 40 degrees    T Axis 50 degrees    Diagnosis Normal sinus rhythm    CBC with Auto Differential    Collection Time: 12/27/22  7:37 PM   Result Value Ref Range    WBC 7.2 4.3 - 11.1 K/uL    RBC 4.49 4.05 - 5.2 M/uL    Hemoglobin 13.3 11.7 - 15.4 g/dL    Hematocrit 40.2 35.8 - 46.3 %    MCV 89.5 82.0 - 102.0 FL    MCH 29.6 26.1 - 32.9 PG    MCHC 33.1 31.4 - 35.0 g/dL    RDW 13.1 11.9 - 14.6 %    Platelets 361 993 - 354 K/uL    MPV 10.1 9.4 - 12.3 FL    nRBC 0.00 0.0 - 0.2 K/uL    Differential Type AUTOMATED      Seg Neutrophils 80 (H) 43 - 78 %    Lymphocytes 18 13 - 44 %    Monocytes 1 (L) 4.0 - 12.0 %    Eosinophils % 0 (L) 0.5 - 7.8 %    Basophils 0 0.0 - 2.0 %    Immature Granulocytes 0 0.0 - 5.0 %    Segs Absolute 5.8 1.7 - 8.2 K/UL    Absolute Lymph # 1.3 0.5 - 4.6 K/UL    Absolute Mono # 0.1 0.1 - 1.3 K/UL    Absolute Eos # 0.0 0.0 - 0.8 K/UL    Basophils Absolute 0.0 0.0 - 0.2 K/UL Absolute Immature Granulocyte 0.0 0.0 - 0.5 K/UL   Comprehensive Metabolic Panel    Collection Time: 12/27/22  7:37 PM   Result Value Ref Range    Sodium 140 133 - 143 mmol/L    Potassium 4.1 3.5 - 5.1 mmol/L    Chloride 106 101 - 110 mmol/L    CO2 24 21 - 32 mmol/L    Anion Gap 10 2 - 11 mmol/L    Glucose 167 (H) 65 - 100 mg/dL    BUN 22 6 - 23 MG/DL    Creatinine 0.91 0.6 - 1.0 MG/DL    Est, Glom Filt Rate >60 >60 ml/min/1.73m2    Calcium 9.2 8.3 - 10.4 MG/DL    Total Bilirubin 0.3 0.2 - 1.1 MG/DL    ALT 36 12 - 65 U/L    AST 15 15 - 37 U/L    Alk Phosphatase 85 50 - 130 U/L    Total Protein 7.6 6.3 - 8.2 g/dL    Albumin 3.6 3.5 - 5.0 g/dL    Globulin 4.0 2.8 - 4.5 g/dL    Albumin/Globulin Ratio 0.9 0.4 - 1.6     Magnesium    Collection Time: 12/27/22  7:37 PM   Result Value Ref Range    Magnesium 2.1 1.8 - 2.4 mg/dL   Troponin    Collection Time: 12/27/22  7:37 PM   Result Value Ref Range    Troponin, High Sensitivity 20.2 (H) 0 - 14 pg/mL   Brain Natriuretic Peptide    Collection Time: 12/27/22  7:37 PM   Result Value Ref Range    NT Pro- (H) 5 - 125 PG/ML   D-Dimer, Quantitative    Collection Time: 12/27/22  7:37 PM   Result Value Ref Range    D-Dimer, Quant 0.42 <0.56 ug/ml(FEU)   Procalcitonin    Collection Time: 12/27/22  7:37 PM   Result Value Ref Range    Procalcitonin <0.05 0.00 - 0.49 ng/mL       Allergies   Allergen Reactions    Latex Rash and Other (See Comments)     itching    Bee Venom Anaphylaxis    Penicillin G Swelling     Throat swelling, difficulty breathing    Other      Immunization History   Administered Date(s) Administered    Tdap (Boostrix, Adacel) 01/01/2018       Recent Vital Data:  Patient Vitals for the past 24 hrs:   Temp Pulse Resp BP SpO2   12/28/22 1020 97.9 °F (36.6 °C) 55 18 117/63 93 %   12/28/22 0812 97.7 °F (36.5 °C) (!) 49 18 117/68 97 %   12/28/22 0438 97.5 °F (36.4 °C) 50 21 126/67 99 %   12/28/22 0040 -- 51 -- 133/62 --   12/28/22 0035 97.3 °F (36.3 °C) 51 20 133/62 98 %       Oxygen Therapy  SpO2: 93 %  O2 Device: None (Room air)    Estimated body mass index is 43.27 kg/m² as calculated from the following:    Height as of 12/27/22: 5' 5\" (1.651 m). Weight as of 12/27/22: 260 lb (117.9 kg). No intake or output data in the 24 hours ending 12/28/22 1039      Physical Exam:    General:    Well nourished. No overt distress  Head:  Normocephalic, atraumatic  Eyes:  Sclerae appear normal.  Pupils equally round. HENT:  Nares appear normal, no drainage. Moist mucous membranes  Neck:  No restricted ROM. Trachea midline  CV:   RRR. No m/r/g. No JVD  Lungs:   Occasional wheezing, no acute distress noted  Abdomen:   Soft, nontender, nondistended. Extremities: Warm and dry. No cyanosis or clubbing. No edema. Skin:     No rashes. Normal coloration  Neuro:  CN II-XII grossly intact. Psych:  Normal mood and affect. Signed:  Frank Ames MD    Part of this note may have been written by using a voice dictation software. The note has been proof read but may still contain some grammatical/other typographical errors.

## 2022-12-28 NOTE — H&P
Hospitalist History and Physical   Admit Date:  No admission date for patient encounter. Name:  Hector Bell   Age:  46 y.o. Sex:  female  :  1970   MRN:  [id number]   Room:  801/    Presenting Complaint: No chief complaint on file. Reason(s) for Admission: COVID [U07.1]     History of Present Illness:   Hector Bell is a 46 y.o. female with medical history of morbid obesity, HTN, RLS, and CRISTHIAN presents with a 5-day history of worsening productive cough and shortness of breath. 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 \" She was given azithromycin and steroids without much relief. She feels \"very congested and tight\" in her chest. Denies fevers/chills. No abdominal pain, nausea/vomiting or diarrhea. She was encouraged to go to ED by physician at work. ED Course: hs-trop of 20. CXR unrevealing. CT chest negative for PE. Given several rounds of jet nebs and steroids. Hospitalist consulted for admission. Review of Systems:  10 systems reviewed and negative except as noted in HPI. Assessment & Plan:     Principal Problem:  COVID  - currently on RA  - CT chest negative for PE  - goal SpO2>90%  - Decadron 6 mg qdaily  - add immunosuppressives if becomes hypoxic  - check procalcitonin  - scheduled jet nebs  - Mucinex  - pulmonary toilet (flutter valve, spirometry)    # Demand ischemia  - likely due to above  - trend trops    # HTN  - current regimen    # Morbid obesity  - complicates course of hospitalization    Diet: No diet orders on file  VTE ppx: Lovenox BID  Code status: No Order    Hospital Problems:  Principal Problem:    COVID  Active Problems:    Bicuspid aortic valve    Generalized anxiety disorder    RLS (restless legs syndrome)    Class 3 severe obesity due to excess calories in adult [facility])    Demand ischemia (HCC)  Resolved Problems:    * No resolved hospital problems.  *       Past History: Past Medical History:   Diagnosis Date    Anxiety     Bicuspid aortic valve     Primary osteoarthritis of both hips     RLS (restless legs syndrome)        Past Surgical History:   Procedure Laterality Date    ACHILLES TENDON SURGERY  2017    OTHER SURGICAL HISTORY      herniated belly button    PARTIAL HYSTERECTOMY (CERVIX NOT REMOVED)  2014    only right ovary removed    TONSILLECTOMY          Social History     Tobacco Use    Smoking status: Former     Types: Cigarettes     Quit date: 2020     Years since quittin.5    Smokeless tobacco: Never   Substance Use Topics    Alcohol use: Yes     Comment: socially      Social History     Substance and Sexual Activity   Drug Use Never       Family History   Problem Relation Age of Onset    Stroke Mother     High Blood Pressure Mother     Uterine Cancer Mother     No Known Problems Father     Hypertension Maternal Grandmother     Diabetes Maternal Grandmother     Breast Cancer Maternal Aunt         Immunization History   Administered Date(s) Administered    Tdap (Boostrix, Adacel) 2018     Allergies   Allergen Reactions    Latex Rash and Other (See Comments)     itching    Bee Venom Anaphylaxis    Penicillin G Swelling     Throat swelling, difficulty breathing    Other      Prior to Admit Medications:  Current Outpatient Medications   Medication Instructions    azithromycin (ZITHROMAX) 250 MG tablet TAKE 2 TABLETS BY MOUTH TODAY, THEN TAKE 1 TABLET DAILY FOR 4 DAYS    b complex vitamins capsule 1 capsule, Oral, DAILY    celecoxib (CELEBREX) 200 MG capsule Celebrex 200 mg capsule  Take 1 capsule twice a day by oral route.     clonazePAM (KLONOPIN) 1 mg, Oral, 2 times daily    EPINEPHrine (EPIPEN 2-IDALIA) 0.3 mg, SubCUTAneous, ONCE, Use as directed for allergic reaction    FLUoxetine (PROZAC) 40 mg, Oral, DAILY    Metoprolol Succinate 50 MG CS24 1 tablet, Oral, 2 times daily    omeprazole (PRILOSEC OTC) 20 MG tablet omeprazole 20 mg tablet,delayed release Take 1 tablet every day by oral route. orlistat (XENICAL) 120 mg, Oral, 3 TIMES DAILY WITH MEALS    pramipexole (MIRAPEX) 1 MG tablet Mirapex 1 mg tablet  Take 1 tablet every day by oral route. predniSONE (DELTASONE) 20 MG tablet TAKE 1 TABLET BY MOUTH EVERY DAY FOR 5 DAYS         Objective:   No data found. Estimated body mass index is 43.27 kg/m² as calculated from the following:    Height as of an earlier encounter on 12/27/22: 5' 5\" (1.651 m). Weight as of an earlier encounter on 12/27/22: 260 lb (117.9 kg). No intake or output data in the 24 hours ending 12/27/22 2245      Physical Exam:    There were no vitals taken for this visit. General:    Well nourished. Head:  Normocephalic, atraumatic  Eyes:  Sclerae appear normal.  Pupils equally round. ENT:  Nares appear normal, no drainage. Moist oral mucosa  Neck:  No restricted ROM. Trachea midline   CV:   RRR. No jugular venous distension. Lungs:   Coarse breath sounds with end-expiratory wheezes, productive cough. Prominent rhonchi on anterior auscultation   Abdomen: Bowel sounds present. Soft, nontender, nondistended. Extremities: No cyanosis or clubbing. No edema  Skin:     No rashes and normal coloration. Warm and dry. Neuro:  CN II-XII grossly intact. Sensation intact. A&Ox3  Psych:  Normal mood and affect.       I have personally reviewed labs and tests showing:  Recent Labs:  Recent Results (from the past 24 hour(s))   EKG 12 Lead    Collection Time: 12/27/22  6:19 PM   Result Value Ref Range    Ventricular Rate 63 BPM    Atrial Rate 63 BPM    P-R Interval 130 ms    QRS Duration 76 ms    Q-T Interval 402 ms    QTc Calculation (Bazett) 411 ms    P Axis 54 degrees    R Axis 40 degrees    T Axis 50 degrees    Diagnosis Normal sinus rhythm    CBC with Auto Differential    Collection Time: 12/27/22  7:37 PM   Result Value Ref Range    WBC 7.2 4.3 - 11.1 K/uL    RBC 4.49 4.05 - 5.2 M/uL    Hemoglobin 13.3 11.7 - 15.4 g/dL Hematocrit 40.2 35.8 - 46.3 %    MCV 89.5 82.0 - 102.0 FL    MCH 29.6 26.1 - 32.9 PG    MCHC 33.1 31.4 - 35.0 g/dL    RDW 13.1 11.9 - 14.6 %    Platelets 090 561 - 408 K/uL    MPV 10.1 9.4 - 12.3 FL    nRBC 0.00 0.0 - 0.2 K/uL    Differential Type AUTOMATED      Seg Neutrophils 80 (H) 43 - 78 %    Lymphocytes 18 13 - 44 %    Monocytes 1 (L) 4.0 - 12.0 %    Eosinophils % 0 (L) 0.5 - 7.8 %    Basophils 0 0.0 - 2.0 %    Immature Granulocytes 0 0.0 - 5.0 %    Segs Absolute 5.8 1.7 - 8.2 K/UL    Absolute Lymph # 1.3 0.5 - 4.6 K/UL    Absolute Mono # 0.1 0.1 - 1.3 K/UL    Absolute Eos # 0.0 0.0 - 0.8 K/UL    Basophils Absolute 0.0 0.0 - 0.2 K/UL    Absolute Immature Granulocyte 0.0 0.0 - 0.5 K/UL   Comprehensive Metabolic Panel    Collection Time: 12/27/22  7:37 PM   Result Value Ref Range    Sodium 140 133 - 143 mmol/L    Potassium 4.1 3.5 - 5.1 mmol/L    Chloride 106 101 - 110 mmol/L    CO2 24 21 - 32 mmol/L    Anion Gap 10 2 - 11 mmol/L    Glucose 167 (H) 65 - 100 mg/dL    BUN 22 6 - 23 MG/DL    Creatinine 0.91 0.6 - 1.0 MG/DL    Est, Glom Filt Rate >60 >60 ml/min/1.73m2    Calcium 9.2 8.3 - 10.4 MG/DL    Total Bilirubin 0.3 0.2 - 1.1 MG/DL    ALT 36 12 - 65 U/L    AST 15 15 - 37 U/L    Alk Phosphatase 85 50 - 130 U/L    Total Protein 7.6 6.3 - 8.2 g/dL    Albumin 3.6 3.5 - 5.0 g/dL    Globulin 4.0 2.8 - 4.5 g/dL    Albumin/Globulin Ratio 0.9 0.4 - 1.6     Magnesium    Collection Time: 12/27/22  7:37 PM   Result Value Ref Range    Magnesium 2.1 1.8 - 2.4 mg/dL   Troponin    Collection Time: 12/27/22  7:37 PM   Result Value Ref Range    Troponin, High Sensitivity 20.2 (H) 0 - 14 pg/mL   Brain Natriuretic Peptide    Collection Time: 12/27/22  7:37 PM   Result Value Ref Range    NT Pro- (H) 5 - 125 PG/ML   D-Dimer, Quantitative    Collection Time: 12/27/22  7:37 PM   Result Value Ref Range    D-Dimer, Quant 0.42 <0.56 ug/ml(FEU)   Procalcitonin    Collection Time: 12/27/22  7:37 PM   Result Value Ref Range Procalcitonin <0.05 0.00 - 0.49 ng/mL       I have personally reviewed imaging studies showing:  XR CHEST PORTABLE    Result Date: 12/27/2022  EXAM: Single view chest radiograph. INDICATION: Covid 19 positive, chest tightness. COMPARISON: Chest radiograph dated December 09, 2021. FINDINGS: No focal lung consolidation. No pneumothorax. No pleural effusion. The heart is normal in size. No evidence of acute osseous abnormality. Nipple piercings evident. 1. No acute cardiopulmonary abnormality. CT CHEST PULMONARY EMBOLISM W CONTRAST    Result Date: 12/27/2022  EXAMINATION: CT CHEST PULMONARY EMBOLISM W CONTRAST 12/27/2022 8:46 PM ACCESSION NUMBER: [id number] COMPARISON: None available INDICATION: covid 19, SOB TECHNIQUE: Multiple contiguous axial CT images of the chest were obtained from the lung apices to the lung bases after the intravenous administration of 100mL Iso-anaya 370 per pulmonary angiography protocol. Coronal reconstructions were performed. Radiation dose reduction techniques were used for this study. Our CT scanners use one or all of the following: Automated exposure control, adjustment of the mA and/or kV according to patient size, iterative reconstruction. FINDINGS: STUDY QUALITY: The exam study quality is good. AIRWAYS: Subsegmental mucous plugging within the lower lobes. Mild bronchial wall thickening. Central airways are otherwise patent. LUNGS: No airspace consolidation or groundglass. No suspicious pulmonary nodules. PLEURA: No pleural effusion or pneumothorax. HEART: The heart is not enlarged. No calcified coronary atherosclerosis. No pericardial effusion. THORACIC AORTA: The aorta is normal in caliber. PULMONARY ARTERY: No pulmonary embolus to the segmental level. The main pulmonary artery is normal in caliber. MEDIASTINUM/JOSH: No mediastinal mass or lymphadenopathy. CHEST WALL: No mass or axillary lymphadenopathy. UPPER ABDOMEN: The visualized upper abdomen is unremarkable.  BONES: No suspicious osseous lesion. 1.  No pulmonary embolus. 2.  Mild lower lobe predominant bronchial wall thickening and subsegmental mucous plugging, nonspecific but could represent sequela of bronchitis. Echocardiogram:  No results found for this or any previous visit. No orders of the defined types were placed in this encounter. Signed:  Lynnette Zaman DO    Part of this note may have been written by using a voice dictation software. The note has been proof read but may still contain some grammatical/other typographical errors.

## 2022-12-29 ENCOUNTER — CARE COORDINATION (OUTPATIENT)
Dept: CARE COORDINATION | Facility: CLINIC | Age: 52
End: 2022-12-29

## 2022-12-29 NOTE — CARE COORDINATION
Date/Time:  12/29/2022 9:27 AM  Attempted to reach patient by telephone. Call within 2 business days of discharge: Yes Left HIPPA compliant message requesting a return call. Will attempt to reach patient again.

## 2022-12-30 ENCOUNTER — CARE COORDINATION (OUTPATIENT)
Dept: CARE COORDINATION | Facility: CLINIC | Age: 52
End: 2022-12-30

## 2022-12-30 NOTE — CARE COORDINATION
Patient contacted regarding COVID-19 diagnosis. Discussed COVID-19 related testing which was available at this time. Test results were positive. Patient informed of results, if available? Yes. Care Transition Nurse contacted the patient by telephone to perform post discharge assessment. Call within 2 business days of discharge: Yes. Verified name and  with patient as identifiers. Provided introduction to self, and explanation of the CTN/ACM role, and reason for call due to risk factors for infection and/or exposure to COVID-19. Symptoms reviewed with patient who verbalized the following symptoms: no new symptoms  no worsening symptoms. Due to no new or worsening symptoms encounter was not routed to provider for escalation. Discussed follow-up appointments. If no appointment was previously scheduled, appointment scheduling offered: PCP-Tracy Chandler-1/3/2023 at 10:30 am.  [facility] follow up appointment(s): No future appointments. Non-[facility] follow up appointment(s): PCP-Tracy Chandler-1/3/2023 at 10:30 am.    Non-face-to-face services provided:  Obtained and reviewed discharge summary and/or continuity of care documents  Reviewed importance of monitoring and recording O2 for MD to review. If you notice that your blood oxygen level stays below 88% while being active OR stays below 90% while sitting or standing, PLEASE RETURN IMMEDIATELY TO THE EMERGENCY DEPARTMENT. If your shortness of breath is severe or getting worse, no matter what your oxygen level states, PLEASE RETURN IMMEDIATELY TO THE EMERGENCY DEPARTMENT. If you have chest pain, fainting episodes, heart palpitations, or any other serious medical issue, PLEASE RETURN IMMEDIATELY TO THE EMERGENCY DEPARTMENT. Advance Care Planning:   Does patient have an Advance Directive:  decision maker updated. Educated patient about risk for severe COVID-19 due to risk factors according to CDC guidelines.  CTN reviewed discharge instructions, medical action plan and red flag symptoms with the patient who verbalized understanding. Discussed COVID vaccination status: No. Education provided on COVID-19 vaccination as appropriate. Discussed exposure protocols and quarantine with CDC Guidelines. Patient was given an opportunity to verbalize any questions and concerns and agrees to contact CTN or health care provider for questions related to their healthcare. Reviewed and educated patient on any new and changed medications related to discharge diagnosis     Was patient discharged with a pulse oximeter? Patient has her own pulse oximeter. Reports O2 96% at time of call      CTN provided contact information. Plan for follow-up call in 5-7 days based on severity of symptoms and risk factors.

## 2023-01-02 ENCOUNTER — TELEPHONE (OUTPATIENT)
Dept: INTERNAL MEDICINE CLINIC | Facility: CLINIC | Age: 53
End: 2023-01-02

## 2023-01-05 ENCOUNTER — CARE COORDINATION (OUTPATIENT)
Dept: CARE COORDINATION | Facility: CLINIC | Age: 53
End: 2023-01-05

## 2023-01-05 NOTE — CARE COORDINATION
CTN attempted outreach to patient for COVID PATRIA follow up call. Unable to reach patient. Message left with contact information requesting a return call. Will continue to outreach per protocol if no return call received.

## 2023-01-12 ENCOUNTER — CARE COORDINATION (OUTPATIENT)
Dept: CARE COORDINATION | Facility: CLINIC | Age: 53
End: 2023-01-12

## 2023-01-12 NOTE — CARE COORDINATION
CTN attempted outreach to patient for COVID PATRIA follow up call. Unable to reach patient. Message left with contact information requesting a return call. Will close episode at this time due to multiple attempts to outreach with no return call.

## 2023-02-03 DIAGNOSIS — Z12.11 SCREEN FOR COLON CANCER: ICD-10-CM

## 2023-05-04 ENCOUNTER — APPOINTMENT (OUTPATIENT)
Dept: GENERAL RADIOLOGY | Age: 53
End: 2023-05-04
Payer: COMMERCIAL

## 2023-05-04 ENCOUNTER — APPOINTMENT (OUTPATIENT)
Dept: CT IMAGING | Age: 53
End: 2023-05-04
Payer: COMMERCIAL

## 2023-05-04 ENCOUNTER — HOSPITAL ENCOUNTER (EMERGENCY)
Age: 53
Discharge: HOME | End: 2023-05-04
Attending: STUDENT IN AN ORGANIZED HEALTH CARE EDUCATION/TRAINING PROGRAM
Payer: COMMERCIAL

## 2023-05-04 VITALS
TEMPERATURE: 97.8 F | HEART RATE: 59 BPM | WEIGHT: 260 LBS | DIASTOLIC BLOOD PRESSURE: 96 MMHG | BODY MASS INDEX: 43.32 KG/M2 | RESPIRATION RATE: 11 BRPM | SYSTOLIC BLOOD PRESSURE: 113 MMHG | OXYGEN SATURATION: 100 % | HEIGHT: 65 IN

## 2023-05-04 DIAGNOSIS — R07.9 CHEST PAIN, UNSPECIFIED TYPE: Primary | ICD-10-CM

## 2023-05-04 DIAGNOSIS — Q23.1 BICUSPID AORTIC VALVE: ICD-10-CM

## 2023-05-04 LAB
ALBUMIN SERPL-MCNC: 3.3 G/DL (ref 3.5–5)
ALBUMIN/GLOB SERPL: 0.8 (ref 0.4–1.6)
ALP SERPL-CCNC: 82 U/L (ref 50–136)
ALT SERPL-CCNC: 32 U/L (ref 12–65)
ANION GAP SERPL CALC-SCNC: 5 MMOL/L (ref 2–11)
AST SERPL-CCNC: 18 U/L (ref 15–37)
BILIRUB SERPL-MCNC: 0.4 MG/DL (ref 0.2–1.1)
BUN SERPL-MCNC: 15 MG/DL (ref 6–23)
CALCIUM SERPL-MCNC: 9.5 MG/DL (ref 8.3–10.4)
CHLORIDE SERPL-SCNC: 106 MMOL/L (ref 101–110)
CO2 SERPL-SCNC: 26 MMOL/L (ref 21–32)
CREAT SERPL-MCNC: 0.72 MG/DL (ref 0.6–1)
D DIMER PPP FEU-MCNC: 0.37 UG/ML(FEU)
EKG ATRIAL RATE: 56 BPM
EKG ATRIAL RATE: 62 BPM
EKG DIAGNOSIS: NORMAL
EKG DIAGNOSIS: NORMAL
EKG P AXIS: 37 DEGREES
EKG P AXIS: 37 DEGREES
EKG P-R INTERVAL: 144 MS
EKG P-R INTERVAL: 157 MS
EKG Q-T INTERVAL: 384 MS
EKG Q-T INTERVAL: 418 MS
EKG QRS DURATION: 84 MS
EKG QRS DURATION: 87 MS
EKG QTC CALCULATION (BAZETT): 390 MS
EKG QTC CALCULATION (BAZETT): 407 MS
EKG R AXIS: 27 DEGREES
EKG R AXIS: 39 DEGREES
EKG T AXIS: 40 DEGREES
EKG T AXIS: 50 DEGREES
EKG VENTRICULAR RATE: 57 BPM
EKG VENTRICULAR RATE: 62 BPM
ERYTHROCYTE [DISTWIDTH] IN BLOOD BY AUTOMATED COUNT: 12.2 % (ref 11.9–14.6)
GLOBULIN SER CALC-MCNC: 3.9 G/DL (ref 2.8–4.5)
GLUCOSE SERPL-MCNC: 96 MG/DL (ref 65–100)
HCT VFR BLD AUTO: 38.4 % (ref 35.8–46.3)
HGB BLD-MCNC: 12.6 G/DL (ref 11.7–15.4)
LIPASE SERPL-CCNC: 128 U/L (ref 73–393)
MAGNESIUM SERPL-MCNC: 1.8 MG/DL (ref 1.8–2.4)
MCH RBC QN AUTO: 30.6 PG (ref 26.1–32.9)
MCHC RBC AUTO-ENTMCNC: 32.8 G/DL (ref 31.4–35)
MCV RBC AUTO: 93.2 FL (ref 82–102)
NRBC # BLD: 0 K/UL (ref 0–0.2)
PLATELET # BLD AUTO: 264 K/UL (ref 150–450)
PMV BLD AUTO: 10.1 FL (ref 9.4–12.3)
POTASSIUM SERPL-SCNC: 4 MMOL/L (ref 3.5–5.1)
PROT SERPL-MCNC: 7.2 G/DL (ref 6.3–8.2)
RBC # BLD AUTO: 4.12 M/UL (ref 4.05–5.2)
SODIUM SERPL-SCNC: 137 MMOL/L (ref 133–143)
TROPONIN I SERPL HS-MCNC: 11.7 PG/ML (ref 0–14)
TROPONIN I SERPL HS-MCNC: 15.2 PG/ML (ref 0–14)
WBC # BLD AUTO: 8.3 K/UL (ref 4.3–11.1)

## 2023-05-04 PROCEDURE — 99285 EMERGENCY DEPT VISIT HI MDM: CPT

## 2023-05-04 PROCEDURE — 93005 ELECTROCARDIOGRAM TRACING: CPT | Performed by: STUDENT IN AN ORGANIZED HEALTH CARE EDUCATION/TRAINING PROGRAM

## 2023-05-04 PROCEDURE — 85027 COMPLETE CBC AUTOMATED: CPT

## 2023-05-04 PROCEDURE — 6360000002 HC RX W HCPCS: Performed by: STUDENT IN AN ORGANIZED HEALTH CARE EDUCATION/TRAINING PROGRAM

## 2023-05-04 PROCEDURE — 80053 COMPREHEN METABOLIC PANEL: CPT

## 2023-05-04 PROCEDURE — 84484 ASSAY OF TROPONIN QUANT: CPT

## 2023-05-04 PROCEDURE — 70450 CT HEAD/BRAIN W/O DYE: CPT

## 2023-05-04 PROCEDURE — 6370000000 HC RX 637 (ALT 250 FOR IP): Performed by: STUDENT IN AN ORGANIZED HEALTH CARE EDUCATION/TRAINING PROGRAM

## 2023-05-04 PROCEDURE — 96374 THER/PROPH/DIAG INJ IV PUSH: CPT

## 2023-05-04 PROCEDURE — 96375 TX/PRO/DX INJ NEW DRUG ADDON: CPT

## 2023-05-04 PROCEDURE — 85379 FIBRIN DEGRADATION QUANT: CPT

## 2023-05-04 PROCEDURE — 83735 ASSAY OF MAGNESIUM: CPT

## 2023-05-04 PROCEDURE — 83690 ASSAY OF LIPASE: CPT

## 2023-05-04 PROCEDURE — 94760 N-INVAS EAR/PLS OXIMETRY 1: CPT

## 2023-05-04 PROCEDURE — 71046 X-RAY EXAM CHEST 2 VIEWS: CPT

## 2023-05-04 RX ORDER — ASPIRIN 325 MG
325 TABLET ORAL
Status: COMPLETED | OUTPATIENT
Start: 2023-05-04 | End: 2023-05-04

## 2023-05-04 RX ORDER — ONDANSETRON 2 MG/ML
4 INJECTION INTRAMUSCULAR; INTRAVENOUS
Status: COMPLETED | OUTPATIENT
Start: 2023-05-04 | End: 2023-05-04

## 2023-05-04 RX ORDER — ASPIRIN 81 MG/1
81 TABLET ORAL DAILY
Qty: 30 TABLET | Refills: 0 | Status: SHIPPED | OUTPATIENT
Start: 2023-05-04 | End: 2023-06-03

## 2023-05-04 RX ORDER — MORPHINE SULFATE 4 MG/ML
4 INJECTION INTRAVENOUS ONCE
Status: COMPLETED | OUTPATIENT
Start: 2023-05-04 | End: 2023-05-04

## 2023-05-04 RX ORDER — 0.9 % SODIUM CHLORIDE 0.9 %
500 INTRAVENOUS SOLUTION INTRAVENOUS
Status: DISCONTINUED | OUTPATIENT
Start: 2023-05-04 | End: 2023-05-04

## 2023-05-04 RX ORDER — ACETAMINOPHEN 500 MG
1000 TABLET ORAL
Status: COMPLETED | OUTPATIENT
Start: 2023-05-04 | End: 2023-05-04

## 2023-05-04 RX ADMIN — ASPIRIN 325 MG: 325 TABLET, FILM COATED ORAL at 12:31

## 2023-05-04 RX ADMIN — ONDANSETRON 4 MG: 2 INJECTION INTRAMUSCULAR; INTRAVENOUS at 13:31

## 2023-05-04 RX ADMIN — MORPHINE SULFATE 4 MG: 4 INJECTION INTRAVENOUS at 13:31

## 2023-05-04 RX ADMIN — ACETAMINOPHEN 1000 MG: 500 TABLET, FILM COATED ORAL at 12:31

## 2023-05-04 ASSESSMENT — ENCOUNTER SYMPTOMS
TROUBLE SWALLOWING: 0
COUGH: 0
FACIAL SWELLING: 0
NAUSEA: 1
SHORTNESS OF BREATH: 1
ABDOMINAL PAIN: 0
VOMITING: 0
PHOTOPHOBIA: 0

## 2023-05-04 ASSESSMENT — PAIN SCALES - GENERAL: PAINLEVEL_OUTOF10: 6

## 2023-05-04 ASSESSMENT — PAIN - FUNCTIONAL ASSESSMENT: PAIN_FUNCTIONAL_ASSESSMENT: 0-10

## 2023-05-04 NOTE — ED NOTES
I have reviewed discharge instructions with the patient. The patient verbalized understanding. Patient left ED via Discharge Method: ambulatory to Home with self. Opportunity for questions and clarification provided. Patient given 1 scripts. To continue your aftercare when you leave the hospital, you may receive an automated call from our care team to check in on how you are doing. This is a free service and part of our promise to provide the best care and service to meet your aftercare needs.  If you have questions, or wish to unsubscribe from this service please call [phone]. Thank you for Choosing our [facility] Emergency Department.        Susannah Fuentes RN  05/04/23 1500

## 2023-05-04 NOTE — DISCHARGE INSTRUCTIONS
Your work-up today was reassuring. You had a negative screen for blood clots in your lungs. You had 2 normal troponins which are enzymes that evaluate for heart attack. You had 2 normal EKGs without changes. Your head scan was normal.  I have placed a referral for you to follow-up with cardiology. You may need an echocardiogram to check on the status of your aortic valve. Begin taking daily aspirin as prescribed. Return here for new or worsening symptoms. As we discussed, I did not find a life threatening cause of your symptoms today. However, THAT DOES NOT MEAN IT COULD NOT DEVELOP. If you develop ANY new or worsening symptoms, it is critical that you return for re-evaluation. This includes any symptoms that are concerning to you, especially symptoms such as new or worsening chest pain, trouble breathing. If you do not return for re-evaluation, you risk serious complications, including death.

## 2023-05-04 NOTE — ED TRIAGE NOTES
Pt states she has had chest pressure, nausea, and shortness of breath for approximately 3 days. Pt states the shortness of breath worsens with exertion. No respiratory distress present.

## 2023-05-04 NOTE — ED PROVIDER NOTES
Emergency Department Provider Note       PCP: Rico Lin MD   Age: 46 y.o. Sex: female     DISPOSITION Decision To Discharge 05/04/2023 02:38:07 PM       ICD-10-CM    1. Chest pain, unspecified type  R07.9 [address]      2. Bicuspid aortic valve  Q23.1 [facility] Cardiology [facility] Decision Making     Complexity of Problems Addressed:  1 or more acute illnesses that pose a threat to life or bodily function. Data Reviewed and Analyzed:  Category 1:   I independently ordered and reviewed each unique test.  I reviewed external records: provider visit note from PCP. I reviewed external records: provider visit note from outside specialist.       Category 2:   I independently ordered and interpreted the ED EKG in the absence of a Cardiologist.    ED EKG #1  Rate: 62  EKG Interpretation: EKG Interpretation: sinus rhythm, no evidence of arrhythmia  ST Segments: Normal ST segments - NO STEMI    ===================================  ED EKG 2 Interpretation  EKG was interpreted in the absence of a cardiologist.    Rate: 57  EKG Interpretation: EKG Interpretation: sinus rhythm, no evidence of arrhythmia  ST Segments: Normal ST segments - NO STEMI  NO DYNAMIC EKG CHANGES COMPARED WITH FIRST EKG    HIEN COLEMAN 2:56 PM         Category 3: Discussion of management or test interpretation. In summary this patient presents with symptoms of chest pain of uncertain cause. Based on my evaluation I feel the patient is at a low risk for alternative diagnoses such as acute coronary syndrome, pulmonary embolism, aortic dissection, esophageal rupture, cardiac tamponade, tension pneumothorax. The reasoning behind my medical decision-making process is that the patient is grossly well-appearing on exam in no acute distress. Vital signs are stable without fever, tachycardia, tachypnea, hypotension, hypoxia. No syncope. Multiple EKG normal with 2 negative troponin.  No

## 2023-08-25 ENCOUNTER — HOSPITAL ENCOUNTER (EMERGENCY)
Age: 53
Discharge: HOME | End: 2023-08-25
Attending: GENERAL PRACTICE
Payer: COMMERCIAL

## 2023-08-25 ENCOUNTER — APPOINTMENT (OUTPATIENT)
Dept: GENERAL RADIOLOGY | Age: 53
End: 2023-08-25
Payer: COMMERCIAL

## 2023-08-25 VITALS
BODY MASS INDEX: 42.32 KG/M2 | TEMPERATURE: 98.9 F | HEART RATE: 71 BPM | RESPIRATION RATE: 20 BRPM | WEIGHT: 254 LBS | SYSTOLIC BLOOD PRESSURE: 142 MMHG | OXYGEN SATURATION: 99 % | DIASTOLIC BLOOD PRESSURE: 79 MMHG | HEIGHT: 65 IN

## 2023-08-25 DIAGNOSIS — M24.812 INTERNAL DERANGEMENT OF LEFT SHOULDER: Primary | ICD-10-CM

## 2023-08-25 PROCEDURE — 99283 EMERGENCY DEPT VISIT LOW MDM: CPT

## 2023-08-25 PROCEDURE — 73030 X-RAY EXAM OF SHOULDER: CPT

## 2023-08-25 ASSESSMENT — PAIN - FUNCTIONAL ASSESSMENT: PAIN_FUNCTIONAL_ASSESSMENT: 0-10

## 2023-08-25 ASSESSMENT — PAIN SCALES - GENERAL
PAINLEVEL_OUTOF10: 8
PAINLEVEL_OUTOF10: 9

## 2023-08-25 ASSESSMENT — PAIN DESCRIPTION - ORIENTATION
ORIENTATION: LEFT
ORIENTATION: LEFT

## 2023-08-25 ASSESSMENT — PAIN DESCRIPTION - LOCATION
LOCATION: SHOULDER
LOCATION: SHOULDER

## 2023-08-25 NOTE — DISCHARGE INSTRUCTIONS
As discussed, the x-ray of your shoulder looks okay and there is really no broken bones in the joint alignment is appropriate. Again, it is certainly possible you have some soft tissue injury versus rotator cuff injury. That is why it is important to follow-up with orthopedics if you have continued pain after 1 to 2 weeks of conservative treatment. As I mentioned to you, I have placed a referral into orthopedics. If you not hear from them in 10 days and your swelling pain, I would contact them. I have attached their information for you to call them on your own accord if needed.

## 2023-08-25 NOTE — ED TRIAGE NOTES
Pt ambulatory to triage. Pt reports left shoulder pain that started Saturday. Pt states she was playing games when the pain started. Pt thinks she has a dislocated shoulder. Pt states she can feel a pop in left shoulder when she elevates her arm. Pt denies taking anything for pain today.

## 2023-08-25 NOTE — ED PROVIDER NOTES
Emergency Department Provider Note       PCP: RAMY Varma NP   Age: 48 y.o. Sex: female     DISPOSITION Decision To Discharge 08/25/2023 07:55:21 PM       ICD-10-CM    1. Internal derangement of left shoulder  M24.812           Medical Decision Making     Complexity of Problems Addressed:  Complexity of Problem: 1 acute complicated illness or injury. Data Reviewed and Analyzed:  I independently ordered and reviewed each unique test.  I reviewed external records: ED visit note from an outside group. I reviewed external records: provider visit note from outside specialist.     I interpreted the X-rays. No acute fracture    Discussion of management or test interpretation. 58-year-old female presents with daughter after developing some left shoulder pain. X-rays obtained here, radiologist read is no acute osseous abnormality. My independent interpretation agrees. Patient does have some pain with passive range of motion specifically abduction of the left shoulder. Do not appreciate any click or catch. Advised patient conservative treatment at home with analgesic medications, ice. She will attempt use at home, if she has continued pain, she will follow-up with orthopedics. I have already attached this referral in the system for the patient. Also advised that if she does not hear from orthopedics by 10 days time from now when she is not having pain that she should contact them on her own accord. Patient verbalized understanding of aftercare instructions and is amenable to care plan. Risk of Complications and/or Morbidity of Patient Management:  OTC drug management performed, Prescription drug management performed, and Shared medical decision making was utilized in creating the patients health plan today.     History     Maryellen Koenig is a 48 y.o. female who presents to the Emergency Department with chief complaint of    Chief Complaint   Patient presents with    Shoulder Pain

## 2023-09-19 ENCOUNTER — OFFICE VISIT (OUTPATIENT)
Dept: ORTHOPEDIC SURGERY | Age: 53
End: 2023-09-19
Payer: COMMERCIAL

## 2023-09-19 VITALS — BODY MASS INDEX: 43.54 KG/M2 | WEIGHT: 255 LBS | HEIGHT: 64 IN

## 2023-09-19 DIAGNOSIS — M19.012 DEGENERATIVE JOINT DISEASE OF LEFT ACROMIOCLAVICULAR JOINT: ICD-10-CM

## 2023-09-19 DIAGNOSIS — S43.402A SPRAIN OF LEFT SHOULDER, UNSPECIFIED SHOULDER SPRAIN TYPE, INITIAL ENCOUNTER: Primary | ICD-10-CM

## 2023-09-19 PROCEDURE — 99204 OFFICE O/P NEW MOD 45 MIN: CPT | Performed by: ORTHOPAEDIC SURGERY

## 2023-09-19 NOTE — PROGRESS NOTES
rotation  Rotator cuff strength is 5/5.  negative external rotation stress test.   Negative empty can sign  There is no evident anterior or posterior apprehension with a negative sulcus sign. No instability  negative external and internal Rotation lag sign  Neurovascularly intact. The left shoulder has 0 to 40 degrees of active and 0 to 90 degrees passive forward elevation. Marked overhead pain  The exam is limited due to severe pain  Internal rotation is to GT. External rotation is to 20 degrees at the side. The [facility] joint is tender  SC joint is non-tender. Greater tuberosity is tender. Positive bicipital stress test negative Elkin sign  Negative O'Briens sign  negative lift-off sign  Negative belly press sign  Negative bear huggers sign  negative drop sign  negative hornblower's sign  No posterior glenohumeral joint line tenderness. No evident excessive external rotation  Rotator cuff strength is 5-/5 with profound weakness  negative external rotation stress test.   Negative empty can sign  There is no evident anterior or posterior apprehension with a negative sulcus sign. No instability  negative external and internal Rotation lag sign  Neurovascularly intact. Data Reviewed:    3 view x-ray left shoulder dated 8/25/2023 demonstrates a type II acromion. Degenerative changes in the [facility] joint. Glenohumeral joint spaces preserved. No fracture. No dislocation. Impression:   1. Sprain of left shoulder, unspecified shoulder sprain type, initial encounter    2. Degenerative joint disease of left acromioclavicular joint       Rule out internal derangement left shoulder  Bicuspid aortic valve on aspirin  BC with a BMI nearly 44    Plan:   I discussed the problem with the patient. I discussed nonoperative versus operative intervention including injections. Specifically today I discussed my concerns regarding a possible acute rotator cuff repair that may require operative intervention.   We will

## 2023-09-25 ENCOUNTER — OFFICE VISIT (OUTPATIENT)
Dept: ORTHOPEDIC SURGERY | Age: 53
End: 2023-09-25
Payer: COMMERCIAL

## 2023-09-25 DIAGNOSIS — S46.012D TRAUMATIC INCOMPLETE TEAR OF LEFT ROTATOR CUFF, SUBSEQUENT ENCOUNTER: Primary | ICD-10-CM

## 2023-09-25 DIAGNOSIS — S43.432D SUPERIOR GLENOID LABRUM LESION OF LEFT SHOULDER, SUBSEQUENT ENCOUNTER: ICD-10-CM

## 2023-09-25 DIAGNOSIS — S46.112A STRAIN OF LONG HEAD OF LEFT BICEPS: ICD-10-CM

## 2023-09-25 DIAGNOSIS — M19.012 DEGENERATIVE JOINT DISEASE OF LEFT ACROMIOCLAVICULAR JOINT: ICD-10-CM

## 2023-09-25 PROBLEM — S43.432A SUPERIOR GLENOID LABRUM LESION OF LEFT SHOULDER: Status: ACTIVE | Noted: 2023-09-25

## 2023-09-25 PROBLEM — S46.012A TRAUMATIC INCOMPLETE TEAR OF LEFT ROTATOR CUFF: Status: ACTIVE | Noted: 2023-09-25

## 2023-09-25 PROCEDURE — 99215 OFFICE O/P EST HI 40 MIN: CPT | Performed by: ORTHOPAEDIC SURGERY

## 2023-09-25 NOTE — PROGRESS NOTES
injections. In my opinion the patient has exhausted nonoperative modalities. She would be candidate for arthroscopy left shoulder ASD without acromioplasty,  ADCR, extensive debridement SLAP tear, biceps labral complex, glenohumeral joint, subacromial space, mini open rotator cuff repair and biceps tenodesis. This would be performed utilizing general anesthesia with an interscalene block on an outpatient basis. I discussed the risks and benefits of the procedure with her and expected outcomes. Given her history of a bicuspid aortic valve on aspirin and obesity with a BMI over 43 she is at increased risk for postoperative complications including bleeding and infection. I discussed the risks and benefits of the procedure with her and expected outcomes. We will proceed at her convenience    5. Illness that poses a threat to bodily function  Follow up: No follow-ups on file.        S46.012  S46.112  S43.432  M19.012        Amanda Friedman MD

## 2023-09-26 ENCOUNTER — PREP FOR PROCEDURE (OUTPATIENT)
Dept: ORTHOPEDIC SURGERY | Age: 53
End: 2023-09-26

## 2023-09-26 DIAGNOSIS — S46.012D TRAUMATIC INCOMPLETE TEAR OF LEFT ROTATOR CUFF, SUBSEQUENT ENCOUNTER: Primary | ICD-10-CM

## 2023-09-26 RX ORDER — SODIUM CHLORIDE 9 MG/ML
INJECTION, SOLUTION INTRAVENOUS PRN
Status: CANCELLED | OUTPATIENT
Start: 2023-09-26

## 2023-09-26 RX ORDER — SODIUM CHLORIDE 0.9 % (FLUSH) 0.9 %
5-40 SYRINGE (ML) INJECTION PRN
Status: CANCELLED | OUTPATIENT
Start: 2023-09-26

## 2023-09-26 RX ORDER — SODIUM CHLORIDE 0.9 % (FLUSH) 0.9 %
5-40 SYRINGE (ML) INJECTION EVERY 12 HOURS SCHEDULED
Status: CANCELLED | OUTPATIENT
Start: 2023-09-26

## 2023-10-02 RX ORDER — PRAMIPEXOLE DIHYDROCHLORIDE 1 MG/1
1 TABLET ORAL NIGHTLY PRN
COMMUNITY
Start: 2023-09-21

## 2023-10-02 RX ORDER — ESTRADIOL 0.5 MG/1
0.5 TABLET ORAL DAILY
COMMUNITY

## 2023-10-02 NOTE — PERIOP NOTE
Patient verified name and . Order for consent found in EHR and matches case posting; patient verifies procedure. Type 1B surgery, phone assessment complete. Orders received. Labs per surgeon: POC Glucose & Hgb-A1c to be collected dos. Labs per anesthesia protocol: none needed. Echo (10/17/16) and EKG (23) available in EHR for reference if needed. Patient answered medical/surgical history questions at their best of ability. All prior to admission medications documented in EPIC. Patient instructed to take the following medications the day of surgery according to anesthesia guidelines with a small sip of water: Klonopin if needed, Estradiol, Prozac, Metoprolol, Omeprazole. On the day before surgery please take Tylenol (Acetaminophen) 1000mg in the morning and then again before bed. You may substitute for Tylenol 650 mg. Hold all vitamins 7 days prior to surgery and NSAIDS 5 days prior to surgery including 81 mg Aspirin. Prescription meds to hold: Celebrex. Patient instructed on the following:    > Arrive at A Entrance, time of arrival to be called the day before by 1700  > NPO after midnight, unless otherwise indicated, including gum, mints, and ice chips  > Responsible adult must drive patient to the hospital, stay during surgery, and patient will need supervision 24 hours after anesthesia  > Use dial antibacterial soap in shower the night before surgery and on the morning of surgery  > All piercings must be removed prior to arrival.    > Leave all valuables (money and jewelry) at home but bring insurance card and ID on DOS.   > You may be required to pay a deductible or co-pay on the day of your procedure. You can pre-pay by calling [phone] if your surgery is at the [facility] or [phone] if your surgery is at the [facility]. > Do not wear make-up, nail polish, lotions, cologne, perfumes, powders, or oil on skin. Artificial nails are not permitted.

## 2023-10-03 NOTE — H&P
Subjective:     Patient is a 48 y.o. RHD FEMALE WITH LEFT SHOULDER PAIN. SEE OFFICE NOTE. Patient Active Problem List    Diagnosis Date Noted    Traumatic incomplete tear of left rotator cuff 09/25/2023    Strain of long head of left biceps 09/25/2023    Superior glenoid labrum lesion of left shoulder 09/25/2023    COVID 12/27/2022    Demand ischemia 12/27/2022    Class 3 severe obesity due to excess calories in adult [facility]) 07/08/2022    Bee sting allergy 07/08/2022    Bicuspid aortic valve 06/17/2022    Generalized anxiety disorder 06/17/2022    Primary osteoarthritis of both hips 06/17/2022    RLS (restless legs syndrome) 06/17/2022    Cardiac arrhythmia 03/26/2019    Degenerative joint disease of left acromioclavicular joint 09/25/2023     Past Medical History:   Diagnosis Date    Anxiety     Bicuspid aortic valve     last echo in 2016    BMI 40.0-44.9, adult ([address])     Depression     Former smoker     GERD (gastroesophageal reflux disease)     on med for control    Primary osteoarthritis of both hips     Psoriatic arthritis ([address])     RLS (restless legs syndrome)       Past Surgical History:   Procedure Laterality Date    ACHILLES TENDON SURGERY  2017    CARDIAC CATHETERIZATION  10/19/2016    normal coronary arteries and normal left ventricular function    PARTIAL HYSTERECTOMY (CERVIX NOT REMOVED)  2014    only right ovary removed    TONSILLECTOMY      UMBILICAL HERNIA REPAIR        Prior to Admission medications    Medication Sig Start Date End Date Taking?  Authorizing Provider   Multiple Vitamin (MULTIVITAMIN PO) Take 1 tablet by mouth daily   Yes Historical Provider, MD   Cyanocobalamin 1000 MCG/ML KIT Inject as directed every 30 days   Yes Historical Provider, MD   estradiol (ESTRACE) 0.5 MG tablet Take 1 tablet by mouth daily   Yes Historical Provider, MD   pramipexole (MIRAPEX) 1 MG tablet Take 1 tablet by mouth nightly as needed 9/21/23   Historical Provider, MD   aspirin EC 81 MG EC tablet Take 1 tablet by mouth daily 5/4/23 10/2/23  HIEN Ramos   metoprolol tartrate (LOPRESSOR) 50 MG tablet Take 1 tablet by mouth 2 times daily    Historical Provider, MD   zolpidem (AMBIEN) 10 MG tablet Take 1 tablet by mouth nightly as needed for Sleep. Historical Provider, MD   albuterol sulfate HFA (VENTOLIN HFA) 108 (90 Base) MCG/ACT inhaler Inhale 2 puffs into the lungs 4 times daily as needed for Wheezing  Patient not taking: Reported on 10/2/2023 12/28/22   Junior Soto MD   EPINEPHrine (EPIPEN 2-IDALIA) 0.3 MG/0.3ML SOAJ injection Inject 0.3 mLs into the skin once for 1 dose Use as directed for allergic reaction 7/8/22 7/8/22  Ronni Romero MD   b complex vitamins capsule Take 1 capsule by mouth daily    Historical Provider, MD   FLUoxetine (PROZAC) 40 MG capsule Take 1 capsule by mouth daily 6/17/22   RAMY Junior CNP   clonazePAM (KLONOPIN) 1 MG tablet Take 1 tablet by mouth in the morning and at bedtime for 30 days. 6/17/22 10/2/23  RAMY Landry CNP   celecoxib (CELEBREX) 200 MG capsule Celebrex 200 mg capsule  Take 1 capsule twice a day by oral route. 6/17/22   RAMY Junior CNP   omeprazole (PRILOSEC OTC) 20 MG tablet omeprazole 20 mg tablet,delayed release  Take 1 tablet every day by oral route.  6/17/22   RAMY Junior CNP     Allergies   Allergen Reactions    Latex Rash and Other (See Comments)     itching    Bee Venom Anaphylaxis    Penicillin G Swelling     Throat swelling, difficulty breathing    Other     Amphetamine-Dextroamphetamine Palpitations and Dizziness or Vertigo      Social History     Tobacco Use    Smoking status: Former     Packs/day: 1.00     Years: 20.00     Additional pack years: 0.00     Total pack years: 20.00     Types: Cigarettes     Quit date: 6/17/2020     Years since quitting: 3.2    Smokeless tobacco: Never   Substance Use Topics    Alcohol use: Yes     Comment: socially      Family History   Problem Relation Age of Onset

## 2023-10-06 ENCOUNTER — HOSPITAL ENCOUNTER (OUTPATIENT)
Age: 53
Setting detail: OUTPATIENT SURGERY
Discharge: HOME | End: 2023-10-06
Attending: ORTHOPAEDIC SURGERY | Admitting: ORTHOPAEDIC SURGERY
Payer: COMMERCIAL

## 2023-10-06 ENCOUNTER — ANESTHESIA EVENT (OUTPATIENT)
Dept: SURGERY | Age: 53
End: 2023-10-06
Payer: COMMERCIAL

## 2023-10-06 ENCOUNTER — ANESTHESIA (OUTPATIENT)
Dept: SURGERY | Age: 53
End: 2023-10-06
Payer: COMMERCIAL

## 2023-10-06 ENCOUNTER — APPOINTMENT (OUTPATIENT)
Dept: GENERAL RADIOLOGY | Age: 53
End: 2023-10-06
Attending: ORTHOPAEDIC SURGERY
Payer: COMMERCIAL

## 2023-10-06 VITALS
HEIGHT: 65 IN | SYSTOLIC BLOOD PRESSURE: 155 MMHG | HEART RATE: 55 BPM | OXYGEN SATURATION: 92 % | TEMPERATURE: 98.5 F | BODY MASS INDEX: 44.3 KG/M2 | RESPIRATION RATE: 16 BRPM | WEIGHT: 265.9 LBS | DIASTOLIC BLOOD PRESSURE: 79 MMHG

## 2023-10-06 DIAGNOSIS — S46.012D TRAUMATIC INCOMPLETE TEAR OF LEFT ROTATOR CUFF, SUBSEQUENT ENCOUNTER: ICD-10-CM

## 2023-10-06 PROBLEM — M19.012 OSTEOARTHRITIS OF GLENOHUMERAL JOINT, LEFT: Status: ACTIVE | Noted: 2023-10-06

## 2023-10-06 LAB
EST. AVERAGE GLUCOSE BLD GHB EST-MCNC: 117 MG/DL
GLUCOSE BLD STRIP.AUTO-MCNC: 115 MG/DL (ref 65–100)
HBA1C MFR BLD: 5.7 % (ref 4.8–5.6)
SERVICE CMNT-IMP: ABNORMAL

## 2023-10-06 PROCEDURE — 82962 GLUCOSE BLOOD TEST: CPT

## 2023-10-06 PROCEDURE — 7100000010 HC PHASE II RECOVERY - FIRST 15 MIN: Performed by: ORTHOPAEDIC SURGERY

## 2023-10-06 PROCEDURE — 23410 REPAIR ROTATOR CUFF ACUTE: CPT | Performed by: ORTHOPAEDIC SURGERY

## 2023-10-06 PROCEDURE — 83036 HEMOGLOBIN GLYCOSYLATED A1C: CPT

## 2023-10-06 PROCEDURE — 6360000002 HC RX W HCPCS: Performed by: ANESTHESIOLOGY

## 2023-10-06 PROCEDURE — 2500000003 HC RX 250 WO HCPCS: Performed by: ORTHOPAEDIC SURGERY

## 2023-10-06 PROCEDURE — C1713 ANCHOR/SCREW BN/BN,TIS/BN: HCPCS | Performed by: ORTHOPAEDIC SURGERY

## 2023-10-06 PROCEDURE — 7100000001 HC PACU RECOVERY - ADDTL 15 MIN: Performed by: ORTHOPAEDIC SURGERY

## 2023-10-06 PROCEDURE — 29824 SHO ARTHRS SRG DSTL CLAVICLC: CPT | Performed by: ORTHOPAEDIC SURGERY

## 2023-10-06 PROCEDURE — 2580000003 HC RX 258: Performed by: ANESTHESIOLOGY

## 2023-10-06 PROCEDURE — 3600000014 HC SURGERY LEVEL 4 ADDTL 15MIN: Performed by: ORTHOPAEDIC SURGERY

## 2023-10-06 PROCEDURE — 6360000002 HC RX W HCPCS: Performed by: ORTHOPAEDIC SURGERY

## 2023-10-06 PROCEDURE — 7100000011 HC PHASE II RECOVERY - ADDTL 15 MIN: Performed by: ORTHOPAEDIC SURGERY

## 2023-10-06 PROCEDURE — 73030 X-RAY EXAM OF SHOULDER: CPT

## 2023-10-06 PROCEDURE — 3600000004 HC SURGERY LEVEL 4 BASE: Performed by: ORTHOPAEDIC SURGERY

## 2023-10-06 PROCEDURE — 2500000003 HC RX 250 WO HCPCS: Performed by: NURSE ANESTHETIST, CERTIFIED REGISTERED

## 2023-10-06 PROCEDURE — 3700000000 HC ANESTHESIA ATTENDED CARE: Performed by: ORTHOPAEDIC SURGERY

## 2023-10-06 PROCEDURE — 23430 REPAIR BICEPS TENDON: CPT | Performed by: ORTHOPAEDIC SURGERY

## 2023-10-06 PROCEDURE — 2709999900 HC NON-CHARGEABLE SUPPLY: Performed by: ORTHOPAEDIC SURGERY

## 2023-10-06 PROCEDURE — 64415 NJX AA&/STRD BRCH PLXS IMG: CPT | Performed by: ANESTHESIOLOGY

## 2023-10-06 PROCEDURE — 7100000000 HC PACU RECOVERY - FIRST 15 MIN: Performed by: ORTHOPAEDIC SURGERY

## 2023-10-06 PROCEDURE — 6360000002 HC RX W HCPCS: Performed by: NURSE ANESTHETIST, CERTIFIED REGISTERED

## 2023-10-06 PROCEDURE — 3700000001 HC ADD 15 MINUTES (ANESTHESIA): Performed by: ORTHOPAEDIC SURGERY

## 2023-10-06 PROCEDURE — 29823 SHO ARTHRS SRG XTNSV DBRDMT: CPT | Performed by: ORTHOPAEDIC SURGERY

## 2023-10-06 DEVICE — 5.5MM PEEK ZIP SUTURE ANCHOR WITH ¿ CIRCLE TAPER NEEDLES, #2 FORCE FIBER
Type: IMPLANTABLE DEVICE | Site: SHOULDER | Status: FUNCTIONAL
Brand: PEEK ZIP

## 2023-10-06 RX ORDER — LIDOCAINE HYDROCHLORIDE 10 MG/ML
1 INJECTION, SOLUTION INFILTRATION; PERINEURAL
Status: DISCONTINUED | OUTPATIENT
Start: 2023-10-06 | End: 2023-10-06 | Stop reason: HOSPADM

## 2023-10-06 RX ORDER — GLYCOPYRROLATE 0.2 MG/ML
INJECTION INTRAMUSCULAR; INTRAVENOUS PRN
Status: DISCONTINUED | OUTPATIENT
Start: 2023-10-06 | End: 2023-10-06 | Stop reason: SDUPTHER

## 2023-10-06 RX ORDER — DEXAMETHASONE SODIUM PHOSPHATE 10 MG/ML
INJECTION, SOLUTION INTRAMUSCULAR; INTRAVENOUS
Status: COMPLETED | OUTPATIENT
Start: 2023-10-06 | End: 2023-10-06

## 2023-10-06 RX ORDER — LIDOCAINE HYDROCHLORIDE 20 MG/ML
INJECTION, SOLUTION EPIDURAL; INFILTRATION; INTRACAUDAL; PERINEURAL PRN
Status: DISCONTINUED | OUTPATIENT
Start: 2023-10-06 | End: 2023-10-06 | Stop reason: SDUPTHER

## 2023-10-06 RX ORDER — SODIUM CHLORIDE 9 MG/ML
INJECTION, SOLUTION INTRAVENOUS PRN
Status: DISCONTINUED | OUTPATIENT
Start: 2023-10-06 | End: 2023-10-06 | Stop reason: HOSPADM

## 2023-10-06 RX ORDER — HALOPERIDOL 5 MG/ML
1 INJECTION INTRAMUSCULAR
Status: DISCONTINUED | OUTPATIENT
Start: 2023-10-06 | End: 2023-10-06 | Stop reason: HOSPADM

## 2023-10-06 RX ORDER — NEOSTIGMINE METHYLSULFATE 1 MG/ML
INJECTION, SOLUTION INTRAVENOUS PRN
Status: DISCONTINUED | OUTPATIENT
Start: 2023-10-06 | End: 2023-10-06 | Stop reason: SDUPTHER

## 2023-10-06 RX ORDER — EPHEDRINE SULFATE/0.9% NACL/PF 50 MG/5 ML
SYRINGE (ML) INTRAVENOUS PRN
Status: DISCONTINUED | OUTPATIENT
Start: 2023-10-06 | End: 2023-10-06 | Stop reason: SDUPTHER

## 2023-10-06 RX ORDER — HYDROMORPHONE HYDROCHLORIDE 2 MG/1
2 TABLET ORAL EVERY 6 HOURS PRN
Qty: 40 TABLET | Refills: 0 | Status: SHIPPED | OUTPATIENT
Start: 2023-10-06 | End: 2023-10-16

## 2023-10-06 RX ORDER — LIDOCAINE HYDROCHLORIDE AND EPINEPHRINE 10; 10 MG/ML; UG/ML
INJECTION, SOLUTION INFILTRATION; PERINEURAL PRN
Status: DISCONTINUED | OUTPATIENT
Start: 2023-10-06 | End: 2023-10-06 | Stop reason: ALTCHOICE

## 2023-10-06 RX ORDER — NALOXONE HYDROCHLORIDE 4 MG/.1ML
1 SPRAY NASAL PRN
Qty: 1 EACH | Refills: 0 | Status: SHIPPED | OUTPATIENT
Start: 2023-10-06

## 2023-10-06 RX ORDER — SODIUM CHLORIDE 0.9 % (FLUSH) 0.9 %
5-40 SYRINGE (ML) INJECTION EVERY 12 HOURS SCHEDULED
Status: DISCONTINUED | OUTPATIENT
Start: 2023-10-06 | End: 2023-10-06 | Stop reason: HOSPADM

## 2023-10-06 RX ORDER — ONDANSETRON 2 MG/ML
INJECTION INTRAMUSCULAR; INTRAVENOUS PRN
Status: DISCONTINUED | OUTPATIENT
Start: 2023-10-06 | End: 2023-10-06 | Stop reason: SDUPTHER

## 2023-10-06 RX ORDER — MIDAZOLAM HYDROCHLORIDE 2 MG/2ML
2 INJECTION, SOLUTION INTRAMUSCULAR; INTRAVENOUS
Status: COMPLETED | OUTPATIENT
Start: 2023-10-06 | End: 2023-10-06

## 2023-10-06 RX ORDER — IPRATROPIUM BROMIDE AND ALBUTEROL SULFATE 2.5; .5 MG/3ML; MG/3ML
1 SOLUTION RESPIRATORY (INHALATION)
Status: DISCONTINUED | OUTPATIENT
Start: 2023-10-06 | End: 2023-10-06 | Stop reason: HOSPADM

## 2023-10-06 RX ORDER — PROMETHAZINE HYDROCHLORIDE 25 MG/1
25 TABLET ORAL EVERY 6 HOURS PRN
Qty: 20 TABLET | Refills: 0 | Status: SHIPPED | OUTPATIENT
Start: 2023-10-06

## 2023-10-06 RX ORDER — SODIUM CHLORIDE, SODIUM LACTATE, POTASSIUM CHLORIDE, CALCIUM CHLORIDE 600; 310; 30; 20 MG/100ML; MG/100ML; MG/100ML; MG/100ML
INJECTION, SOLUTION INTRAVENOUS CONTINUOUS
Status: DISCONTINUED | OUTPATIENT
Start: 2023-10-06 | End: 2023-10-06 | Stop reason: HOSPADM

## 2023-10-06 RX ORDER — PROCHLORPERAZINE EDISYLATE 5 MG/ML
5 INJECTION INTRAMUSCULAR; INTRAVENOUS
Status: DISCONTINUED | OUTPATIENT
Start: 2023-10-06 | End: 2023-10-06 | Stop reason: HOSPADM

## 2023-10-06 RX ORDER — SODIUM CHLORIDE 0.9 % (FLUSH) 0.9 %
5-40 SYRINGE (ML) INJECTION PRN
Status: DISCONTINUED | OUTPATIENT
Start: 2023-10-06 | End: 2023-10-06 | Stop reason: HOSPADM

## 2023-10-06 RX ORDER — SODIUM CHLORIDE 9 MG/ML
INJECTION, SOLUTION INTRAVENOUS PRN
Status: DISCONTINUED | OUTPATIENT
Start: 2023-10-06 | End: 2023-10-06

## 2023-10-06 RX ORDER — ACETAMINOPHEN 500 MG
1000 TABLET ORAL ONCE
Status: DISCONTINUED | OUTPATIENT
Start: 2023-10-06 | End: 2023-10-06 | Stop reason: HOSPADM

## 2023-10-06 RX ORDER — PROPOFOL 10 MG/ML
INJECTION, EMULSION INTRAVENOUS PRN
Status: DISCONTINUED | OUTPATIENT
Start: 2023-10-06 | End: 2023-10-06 | Stop reason: SDUPTHER

## 2023-10-06 RX ORDER — OXYCODONE HYDROCHLORIDE 5 MG/1
5 TABLET ORAL
Status: DISCONTINUED | OUTPATIENT
Start: 2023-10-06 | End: 2023-10-06 | Stop reason: HOSPADM

## 2023-10-06 RX ORDER — KETOROLAC TROMETHAMINE 10 MG/1
TABLET, FILM COATED ORAL
Qty: 20 TABLET | Refills: 0 | Status: SHIPPED | OUTPATIENT
Start: 2023-10-06

## 2023-10-06 RX ORDER — ACETAMINOPHEN 500 MG
1000 TABLET ORAL EVERY 6 HOURS PRN
COMMUNITY

## 2023-10-06 RX ORDER — FENTANYL CITRATE 50 UG/ML
100 INJECTION, SOLUTION INTRAMUSCULAR; INTRAVENOUS
Status: COMPLETED | OUTPATIENT
Start: 2023-10-06 | End: 2023-10-06

## 2023-10-06 RX ORDER — ROCURONIUM BROMIDE 10 MG/ML
INJECTION, SOLUTION INTRAVENOUS PRN
Status: DISCONTINUED | OUTPATIENT
Start: 2023-10-06 | End: 2023-10-06 | Stop reason: SDUPTHER

## 2023-10-06 RX ADMIN — DEXAMETHASONE SODIUM PHOSPHATE 4 MG: 10 INJECTION, SOLUTION INTRAMUSCULAR; INTRAVENOUS at 07:45

## 2023-10-06 RX ADMIN — Medication 10 MG: at 09:15

## 2023-10-06 RX ADMIN — ONDANSETRON 4 MG: 2 INJECTION INTRAMUSCULAR; INTRAVENOUS at 08:39

## 2023-10-06 RX ADMIN — PHENYLEPHRINE HYDROCHLORIDE 100 MCG: 0.1 INJECTION, SOLUTION INTRAVENOUS at 08:34

## 2023-10-06 RX ADMIN — SODIUM CHLORIDE, SODIUM LACTATE, POTASSIUM CHLORIDE, AND CALCIUM CHLORIDE: 600; 310; 30; 20 INJECTION, SOLUTION INTRAVENOUS at 08:59

## 2023-10-06 RX ADMIN — PROPOFOL 160 MG: 10 INJECTION, EMULSION INTRAVENOUS at 08:12

## 2023-10-06 RX ADMIN — Medication 3 MG: at 09:25

## 2023-10-06 RX ADMIN — FENTANYL CITRATE 50 MCG: 50 INJECTION INTRAMUSCULAR; INTRAVENOUS at 07:45

## 2023-10-06 RX ADMIN — LIDOCAINE HYDROCHLORIDE 80 MG: 20 INJECTION, SOLUTION EPIDURAL; INFILTRATION; INTRACAUDAL; PERINEURAL at 08:12

## 2023-10-06 RX ADMIN — ROPIVACAINE HYDROCHLORIDE 30 ML: 5 INJECTION, SOLUTION EPIDURAL; INFILTRATION; PERINEURAL at 07:45

## 2023-10-06 RX ADMIN — Medication 3 G: at 08:05

## 2023-10-06 RX ADMIN — MIDAZOLAM 2 MG: 1 INJECTION INTRAMUSCULAR; INTRAVENOUS at 07:45

## 2023-10-06 RX ADMIN — SODIUM CHLORIDE, SODIUM LACTATE, POTASSIUM CHLORIDE, AND CALCIUM CHLORIDE 125 ML/HR: 600; 310; 30; 20 INJECTION, SOLUTION INTRAVENOUS at 07:24

## 2023-10-06 RX ADMIN — Medication 10 MG: at 09:03

## 2023-10-06 RX ADMIN — GLYCOPYRROLATE 0.4 MG: 0.2 INJECTION INTRAMUSCULAR; INTRAVENOUS at 09:25

## 2023-10-06 RX ADMIN — ROCURONIUM BROMIDE 50 MG: 50 INJECTION, SOLUTION INTRAVENOUS at 08:12

## 2023-10-06 ASSESSMENT — PAIN DESCRIPTION - DESCRIPTORS: DESCRIPTORS: STABBING;OTHER (COMMENT)

## 2023-10-06 ASSESSMENT — PAIN - FUNCTIONAL ASSESSMENT: PAIN_FUNCTIONAL_ASSESSMENT: 0-10

## 2023-10-06 ASSESSMENT — PAIN SCALES - GENERAL: PAINLEVEL_OUTOF10: 0

## 2023-10-06 NOTE — ANESTHESIA POSTPROCEDURE EVALUATION
Department of Anesthesiology  Postprocedure Note    Patient: Babar Joseph  MRN: [id number]  YOB: 1970  Date of evaluation: 10/6/2023      Procedure Summary     Date: 10/06/23 Room / Location: [facility] MAIN OR 05 / [facility] MAIN OR    Anesthesia Start: Yessi Strong Anesthesia Stop: 1111    Procedure: left shoulder arthroscopy, arthroscopic subacromial decompression without acromioplasty, arthroscopic distal clavicle resection, arthroscopic versus mini open rotator cuff repair and biceps tenodesis. general/interscalene (Left: Shoulder) Diagnosis:       Traumatic incomplete tear of left rotator cuff, subsequent encounter      Strain of long head of left biceps      Superior glenoid labrum lesion of left shoulder, subsequent encounter      Degenerative joint disease of left acromioclavicular joint      (Traumatic incomplete tear of left rotator cuff, subsequent encounter [S46.012D])      (Strain of long head of left biceps [S46.112A])      (Superior glenoid labrum lesion of left shoulder, subsequent encounter [S43.432D])      (Degenerative joint disease of left acromioclavicular joint [M19.012])    Surgeons: Amanda Spaulding MD Responsible Provider: Naomie Ocasio MD    Anesthesia Type: general ASA Status: 3          Anesthesia Type: No value filed.     Martha Phase I: Martha Score: 9    Martha Phase II:        Anesthesia Post Evaluation    Patient location during evaluation: PACU  Patient participation: complete - patient participated  Level of consciousness: awake  Pain score: 0  Airway patency: patent  Nausea & Vomiting: no nausea  Complications: no  Cardiovascular status: hemodynamically stable  Respiratory status: acceptable and nonlabored ventilation  Hydration status: stable  Multimodal analgesia pain management approach

## 2023-10-06 NOTE — ANESTHESIA PROCEDURE NOTES
Peripheral Block    Patient location during procedure: pre-op  Reason for block: post-op pain management and at surgeon's request  Start time: 10/6/2023 7:45 AM  End time: 10/6/2023 7:48 AM  Staffing  Performed: anesthesiologist   Anesthesiologist: Kvng Pierce MD  Performed by: Kvng Pierce MD  Authorized by: Kvng Pierce MD    Preanesthetic Checklist  Completed: patient identified, IV checked, site marked, risks and benefits discussed, surgical/procedural consents, equipment checked, pre-op evaluation, timeout performed, anesthesia consent given, oxygen available and monitors applied/VS acknowledged  Peripheral Block   Patient position: supine  Prep: ChloraPrep  Provider prep: mask  Patient monitoring: cardiac monitor, continuous pulse ox, frequent blood pressure checks, IV access and oxygen  Block type: Brachial plexus  Interscalene  Laterality: left  Injection technique: single-shot  Guidance: ultrasound guided    Needle   Needle type: insulated echogenic nerve stimulator needle   Needle gauge: 21 G  Needle localization: ultrasound guidance  Needle length: 10 cm  Assessment   Injection assessment: negative aspiration for heme, no paresthesia on injection, local visualized surrounding nerve on ultrasound and no intravascular symptoms  Paresthesia pain: none  Slow fractionated injection: yes  Hemodynamics: stable  Real-time US image taken/store: yes  Outcomes: uncomplicated and patient tolerated procedure well    Additional Notes  -Block placed for post op pain at surgeon's request.     -Ultrasound used to identify anatomy of nerve bundle. -Needle placement and local injection at perineural area confirmed with real time ultrasound guidance.     -Local visualized with ultrasound surrounding nerve. -Permanent Image taken and placed on chart.       Medications Administered  dexamethasone (DECADRON) (PF) 10 mg/mL injection - Other   4 mg - 10/6/2023 7:45:00 AM  ropivacaine 0.5% with epinephrine [id number]

## 2023-10-06 NOTE — PERIOP NOTE
MD Ash Castillo at bedside with patient. Pt VSS stable. Pain and Nausea controlled at this time. Verbal sign out per MD when pacu care is completed. Plan of care continues.

## 2023-10-06 NOTE — OP NOTE
Bursal side of the cuff was visualized. No evident full-thickness cuff tear. Using an Orate[employer] wand and acromionizing bur, an arthroscopic subacromial decompression was then performed. This was taken down to the level of the deltoid fascia anteriorly, AC joint posteriorly, contoured from medial to lateral.  Once it was complete, our attention was then turned to resecting the distal clavicle. The distal 10 mm and 10 mm of distal clavicle was then resected. Care was taken to the preserve the posterior superior capsule. At this point, given the combination of pathology, it was elected to perform a mini-open approach. Lateral portal was extended to 3 cm. Deltoid split was carried up to acromion. Biceps tendon was identified. A few glenohumeral loose bodies were removed. The biceps was markedly tendinopathic. It was dissected free. It was brought out to length. It was tagged, transected, and tenodesed utilizing one 5.5 Elan anchor and one 4.5 Elan anchor. Biceps tenodesis was excellent. At this point, the attention was then turned to the rotator cuff. The high-grade partial-thickness cuff tear was completed on the bursal side. It was completely mobilized and repaired with two Elan 5.5 anchors. This yielded an excellent cuff repair which was easily brought back to the greater tuberosity. Scope was then placed back in the glenohumeral joint. Stump of the biceps, biceps labral complex, glenohumeral joint, glenohumeral joint capsule were debrided. Undersurface of the rotator cuff was visualized. Anatomic footprint was reestablished. Scope was then placed back on the bursal side. Bursal the cuff repair was stable. Biceps tenodesis was stable. At this point, with the procedure complete, arthroscopic equipment was removed for the shoulder. The portals were approximated using 2-0 nylon horizontal mattress sutures.   The lateral wound was closed with 0 Vicryl figure-of-eight sutures and a 2-0 Prolene subcuticular stitch. A sterile dressing was applied. A sling and swathe was applied. The patient was then transferred to the  recovery room in stable condition. This was an acute rotator cuff tear. MD CECI [id number]  D:  10/06/2023 9:46  T:  10/06/2023 14:36  JOB #:  [id number]  CC:   Alan العراقي MD

## 2023-10-06 NOTE — BRIEF OP NOTE
BRIEF OPERATIVE NOTE    Date of Procedure: 10/6/2023    Preoperative Diagnosis:  ROTATOR CUFF SPRAIN LEFT SHOULDER      BICEPITAL STRAIN LEFT SHOULDER      SLAP TEAR LEFT SHOULDER      AC OA LEFT SHOULDER    Postoperative Diagnosis:  SAME      GLENOHUMERAL OSTEOARTHRITIS LEFT SHOULDER    Procedure(s):  ARTHROSCOPY LEFT SHOULDER ARTHROSCOPIC SUBACROMIAL DECOMPRESSION, DISTAL CLAVICLE RESECTION, EXTENSIVE DEBRIDEMENT SLAP TEAR, BICEPS LABRAL COMPLEX, GLENOHUMERAL JOINT CAPSULE, SUBACROMIAL SPACE, MINI OPEN ROTATOR CUFF REPAIR, BICEPS TENODESIS    Surgeon(s) and Role:     * Yasmani Us MD - Primary         Assistant Staff:  NONE    Surgical Staff:  Circulator: Maryanne Izquierdo, ROSARIO; Raiford Lombard, RN  Surgical Assistant: Sari Garcia  Scrub Person First: Dario Zambrano  Scrub Person Second: Aimee Cramer      * Missing procedure start or end time(s) *    Anesthesia:  GENERAL WITH INTERSCALENE BLOCK    Estimated Blood Loss: 30 CC. Complications: NONE    Implants:   Implant Name Type Inv. Item Serial No.  Lot No. LRB No. Used Action   ANCHOR SUT DIA5. 5MM PEEK ZIP W/ NDL - [id number]  ANCHOR SUT DIA5. 5MM PEEK ZIP W/ NDL [id number] [employer] [id number] Left 1 Implanted   ANCHOR SUT DIA5. 5MM PEEK ZIP W/ NDL - [id number]  ANCHOR SUT DIA5. 5MM PEEK ZIP W/ NDL [id number] [employer] [id number] Left 1 Implanted   AlphaVent Suture Alexis 4.75 mm PEEK Suture Tape with MO-6 Tapered Harris    [id number]  [id number] Left 1 Implanted   ANCHOR SUT DIA5. 5MM PEEK ZIP W/ NDL - [id number]  ANCHOR SUT DIA5. 5MM PEEK ZIP W/ NDL [id number] [employer] [id number] Left 1 Implanted       Yasmani Moran MD

## 2023-10-06 NOTE — H&P
Update History & Physical    The patient's History and Physical of September 25, 2023 was reviewed with the patient and I examined the patient. There was no change. The surgical site was confirmed by the patient and me. Plan: The risks, benefits, expected outcome, and alternative to the recommended procedure have been discussed with the patient. Patient understands and wants to proceed with the procedure.      Electronically signed by Vahid Esquivel MD on 10/6/2023 at 6:22 AM

## 2023-10-06 NOTE — DISCHARGE INSTRUCTIONS
machinery  Do not make important personal or business decisions  Do not drink alcoholic beverages  If you have not urinated within 8 hours after discharge, and you are experiencing discomfort from urinary retention, please go to the nearest ED. If you have sleep apnea and have a CPAP machine, please use it for all naps and sleeping. Please use caution when taking narcotics and any of your home medications that may cause drowsiness. *  Please give a list of your current medications to your Primary Care Provider. *  Please update this list whenever your medications are discontinued, doses are      changed, or new medications (including over-the-counter products) are added. *  Please carry medication information at all times in case of emergency situations. These are general instructions for a healthy lifestyle:  No smoking/ No tobacco products/ Avoid exposure to second hand smoke  Surgeon General's Warning:  Quitting smoking now greatly reduces serious risk to your health. Obesity, smoking, and sedentary lifestyle greatly increases your risk for illness  A healthy diet, regular physical exercise & weight monitoring are important for maintaining a healthy lifestyle    You may be retaining fluid if you have a history of heart failure or if you experience any of the following symptoms:  Weight gain of 3 pounds or more overnight or 5 pounds in a week, increased swelling in our hands or feet or shortness of breath while lying flat in bed. Please call your doctor as soon as you notice any of these symptoms; do not wait until your next office visit.

## 2023-10-18 ENCOUNTER — OFFICE VISIT (OUTPATIENT)
Dept: ORTHOPEDIC SURGERY | Age: 53
End: 2023-10-18

## 2023-10-18 DIAGNOSIS — Z48.89 ENCOUNTER FOR POSTOPERATIVE CARE: Primary | ICD-10-CM

## 2023-10-18 PROCEDURE — 99024 POSTOP FOLLOW-UP VISIT: CPT | Performed by: ORTHOPAEDIC SURGERY

## 2023-10-18 NOTE — PROGRESS NOTES
Name: Sheng Chopra  YOB: 1970  Gender: female  MRN: [id number]              HPI: Sheng Chopra is a 48 y.o. right-hand-dominant female weeks status post arthroscopy left shoulder ASD, ADCR, extensive debridement SLAP tear, biceps labral complex, glenohumeral joint capsule, subacromial space, mini open rotator cuff repair and biceps tenodesis. Operative findings were notable for glenohumeral osteoarthritis left shoulder. She returns and is doing well      ROS/Meds/PSH/PMH/FH/SH: A ten system review of systems was performed and is negative other than what is in the HPI. Tobacco:  reports that she quit smoking about 3 years ago. Her smoking use included cigarettes. She has a 20.00 pack-year smoking history. She has never used smokeless tobacco.  There were no vitals taken for this visit. Physical Examination:  She is an awake alert pleasant female ambulating without difficulty  She has a full range of cervical spine motion without radicular findings    The right shoulder has 0 to 180 degrees of active and 0 to 180 degrees passive forward elevation. Internal rotation is to T6. External rotation is to 60 degrees at the side. In the 90 degree abducted position 90 degrees of external and 90 degrees internal rotation  The AC joint is non-tender  SC joint is non-tender. Greater tuberosity is non-tender. negative biceps  Negative O'Briens sign  negative lift-off sign  Negative belly press sign  Negative bear huggers sign  negative drop sign  negative hornblower's sign  No posterior glenohumeral joint line tenderness. No evident excessive external rotation  Rotator cuff strength is 5/5.  negative external rotation stress test.   Negative empty can sign  There is no evident anterior or posterior apprehension with a negative sulcus sign. No instability  negative external and internal Rotation lag sign  Neurovascularly intact.     The left shoulder incisions have healed  Sutures

## 2023-10-19 ENCOUNTER — CLINICAL DOCUMENTATION (OUTPATIENT)
Dept: ORTHOPEDIC SURGERY | Age: 53
End: 2023-10-19

## 2023-10-27 DIAGNOSIS — Z48.89 ENCOUNTER FOR POSTOPERATIVE CARE: Primary | ICD-10-CM

## 2023-10-27 RX ORDER — HYDROMORPHONE HYDROCHLORIDE 2 MG/1
2 TABLET ORAL EVERY 6 HOURS PRN
Qty: 40 TABLET | Refills: 0 | Status: SHIPPED | OUTPATIENT
Start: 2023-10-27 | End: 2023-11-06

## 2023-11-01 ENCOUNTER — HOSPITAL ENCOUNTER (OUTPATIENT)
Dept: PHYSICAL THERAPY | Age: 53
Setting detail: RECURRING SERIES
Discharge: HOME | End: 2023-11-04
Attending: ORTHOPAEDIC SURGERY
Payer: COMMERCIAL

## 2023-11-01 DIAGNOSIS — M25.512 ACUTE PAIN OF LEFT SHOULDER: ICD-10-CM

## 2023-11-01 DIAGNOSIS — M19.012 ARTHRITIS OF LEFT SHOULDER REGION: Primary | ICD-10-CM

## 2023-11-01 DIAGNOSIS — M25.612 STIFFNESS OF LEFT SHOULDER, NOT ELSEWHERE CLASSIFIED: ICD-10-CM

## 2023-11-01 DIAGNOSIS — S46.112S STRAIN OF MUSCLE, FASCIA AND TENDON OF LONG HEAD OF BICEPS, LEFT ARM, SEQUELA: ICD-10-CM

## 2023-11-01 PROCEDURE — 97161 PT EVAL LOW COMPLEX 20 MIN: CPT

## 2023-11-01 NOTE — THERAPY EVALUATION
sensory to L UE intact. Functional Mobility:  Sit to/from Stand: independent. Bed Mobility: independent. Independent with basic mobility. Min assist with dressing and grooming ADL's. ASSESSMENT   Initial Assessment:  Ms. Rodríguez Shows is 3 1/2 weeks s/p Arthroscopy of left shoulder, arthroscopic subacromial decompression, arthroscopic distal clavicle resection, extensive debridement SLAP tear, biceps labral complex, glenohumeral joint capsule, subacromial space, mini-open rotator cuff repair and biceps tenodesis. Post-op pain, swelling, wound healing, weakness and decreased ROM are limiting normalized use and function of left UE. She will benefit from PT for guided post-op shoulder rehab to promote safe return to normalized use of the UE with daily activities. Problem List: (Impacting functional limitations): Body Structures, Functions, Activity Limitations Requiring Skilled Therapeutic Intervention: Decreased functional mobility ; Decreased strength; Decreased ROM; Increased pain     Therapy Prognosis:   Therapy Prognosis: Good     Initial Assessment Complexity:   Decision Making: Low Complexity    PLAN   Effective Dates: 11/1/23 TO Plan of Care/Certification Expiration Date: 01/30/24     Frequency/Duration: Plan Frequency: 1-2x/week for 90 days     Interventions Planned (Treatment may consist of any combination of the following):    Current Treatment Recommendations: Strengthening; ROM; Manual; Home exercise program; Modalities     GOALS: (Goals have been discussed and agreed upon with patient.)  Short-Term Functional Goals: Time Frame: 30 days  Report no more than 5/10 intermittent pain to left shoulder with compensatory use during basic functional activities. Left shoulder PROM forward elevation greater than 145 degrees and external rotation greater than 60 degrees to progress into functional ranges.   Demonstrate good Left shoulder isometric strength with manual testing to progress into strength

## 2023-11-03 ENCOUNTER — HOSPITAL ENCOUNTER (OUTPATIENT)
Dept: PHYSICAL THERAPY | Age: 53
Setting detail: RECURRING SERIES
Discharge: HOME | End: 2023-11-06
Attending: ORTHOPAEDIC SURGERY
Payer: COMMERCIAL

## 2023-11-03 PROCEDURE — 97140 MANUAL THERAPY 1/> REGIONS: CPT

## 2023-11-03 PROCEDURE — 97110 THERAPEUTIC EXERCISES: CPT

## 2023-11-03 NOTE — PROGRESS NOTES
Patty Palma  : 1970  Primary: Mark Brewer Sc ([facility])  Secondary:  [address] @ [employer]  [address] [address]  Phone: [phone]  Fax: [phone] Plan Frequency: 1-2x/week for 90 days    Plan of Care/Certification Expiration Date: 24      >PT Visit Info:  Plan Frequency: 1-2x/week for 90 days  Plan of Care/Certification Expiration Date: 24  Total # of Visits to Date: 2      Visit Count:  2    OUTPATIENT PHYSICAL THERAPY: Treatment Note 11/3/2023       Episode  }Appt Desk             Treatment Diagnosis:    Strain of muscle, fascia and tendon of long head of biceps, left arm, sequela  Arthritis of left shoulder region  Acute pain of left shoulder  Stiffness of left shoulder, not elsewhere classified  Medical/Referring Diagnosis:    Arthroscopy of left shoulder, arthroscopic subacromial decompression, arthroscopic distal clavicle resection, extensive debridement SLAP tear, biceps labral complex, glenohumeral joint capsule, subacromial space, mini-open rotator cuff repair and biceps tenodesis  Referring Physician:  Albino Mcgarry MD MD Orders:  eval & treat, home exercise program, and passive motion  Date of Onset:  Onset Date: 10/06/23     Allergies:   Latex, Bee venom, Penicillin g, Other, and Amphetamine-dextroamphetamine  Restrictions/Precautions:  No data recordedNo data recorded     Interventions Planned (Treatment may consist of any combination of the following):    Current Treatment Recommendations: Strengthening; ROM; Manual; Home exercise program; Modalities     >Subjective Comments: Pt reports her shoulder continues to hurt. >Initial:  8    /10>Post Session:       8 /10  Medications Last Reviewed:  11/3/2023  Updated Objective Findings:  None Today  Treatment   THERAPEUTIC EXERCISE: (15 minutes):    Exercises to improve mobility. Required moderate verbal cues to  relax with PROM .   Pt supine and PROM to the L shoulder

## 2023-11-06 ENCOUNTER — HOSPITAL ENCOUNTER (OUTPATIENT)
Dept: PHYSICAL THERAPY | Age: 53
Setting detail: RECURRING SERIES
End: 2023-11-06
Attending: ORTHOPAEDIC SURGERY
Payer: COMMERCIAL

## 2023-11-06 NOTE — PROGRESS NOTES
Mateo Hernandez  : 1970  Primary: Sheri Padgett  Secondary: [unfilled] [address] [place] [address] Drive at [facility] & [address], [address] [place] [address]  [phone]   [phone]      Patient called to cancel today's appointment due to not feeling well.

## 2023-11-10 ENCOUNTER — APPOINTMENT (OUTPATIENT)
Dept: PHYSICAL THERAPY | Age: 53
End: 2023-11-10
Attending: ORTHOPAEDIC SURGERY
Payer: COMMERCIAL

## 2023-11-16 ENCOUNTER — APPOINTMENT (OUTPATIENT)
Dept: PHYSICAL THERAPY | Age: 53
End: 2023-11-16
Attending: ORTHOPAEDIC SURGERY
Payer: COMMERCIAL

## 2023-11-21 ENCOUNTER — TELEPHONE (OUTPATIENT)
Dept: ORTHOPEDIC SURGERY | Age: 53
End: 2023-11-21

## 2023-11-21 ENCOUNTER — OFFICE VISIT (OUTPATIENT)
Dept: ORTHOPEDIC SURGERY | Age: 53
End: 2023-11-21

## 2023-11-21 DIAGNOSIS — Z48.89 ENCOUNTER FOR POSTOPERATIVE CARE: Primary | ICD-10-CM

## 2023-11-21 PROCEDURE — 99024 POSTOP FOLLOW-UP VISIT: CPT | Performed by: ORTHOPAEDIC SURGERY

## 2023-11-21 NOTE — TELEPHONE ENCOUNTER
Please call patient juliette on her today , this is a post op, if she can come this afternoon, she will get an urber to bring her?

## 2023-11-21 NOTE — PROGRESS NOTES
Name: Jenna Garner  YOB: 1970  Gender: female  MRN: [id number]              HPI: Jenna Garner is a 48 y.o. right-hand-dominant female 5 weeks status post arthroscopy left shoulder ASD, ADCR, extensive debridement SLAP tear, biceps labral complex, glenohumeral joint capsule, subacromial space, mini open rotator cuff repair and biceps tenodesis. Operative findings were notable for glenohumeral osteoarthritis left shoulder. She returns and is doing well. She notes that her arm fatigues and it is hard for her to work 8 to 12-hour days. Her left knee continues to bother her as well      ROS/Meds/PSH/PMH/FH/SH: A ten system review of systems was performed and is negative other than what is in the HPI. Tobacco:  reports that she quit smoking about 3 years ago. Her smoking use included cigarettes. She has a 20.00 pack-year smoking history. She has never used smokeless tobacco.  There were no vitals taken for this visit. Physical Examination:  She is an awake alert pleasant female ambulating without difficulty  She has a full range of cervical spine motion without radicular findings    The right shoulder has 0 to 180 degrees of active and 0 to 180 degrees passive forward elevation. Internal rotation is to T6. External rotation is to 60 degrees at the side. In the 90 degree abducted position 90 degrees of external and 90 degrees internal rotation  The AC joint is non-tender  SC joint is non-tender. Greater tuberosity is non-tender. negative biceps  Negative O'Briens sign  negative lift-off sign  Negative belly press sign  Negative bear huggers sign  negative drop sign  negative hornblower's sign  No posterior glenohumeral joint line tenderness. No evident excessive external rotation  Rotator cuff strength is 5/5.  negative external rotation stress test.   Negative empty can sign  There is no evident anterior or posterior apprehension with a negative sulcus sign.    No

## 2023-11-27 ENCOUNTER — APPOINTMENT (OUTPATIENT)
Dept: PHYSICAL THERAPY | Age: 53
End: 2023-11-27
Attending: ORTHOPAEDIC SURGERY
Payer: COMMERCIAL

## 2023-11-30 ENCOUNTER — HOSPITAL ENCOUNTER (OUTPATIENT)
Dept: PHYSICAL THERAPY | Age: 53
Setting detail: RECURRING SERIES
End: 2023-11-30
Attending: ORTHOPAEDIC SURGERY
Payer: COMMERCIAL

## 2023-11-30 NOTE — PROGRESS NOTES
Ary Mills  : 1970  Primary: Alexander Padgett  Secondary: [unfilled] [facility] [address] at [address] [facility], [address] [place] [address] Drive  [phone]   [phone]      Patient did not show for today's appointment, despite confirming appointment earlier this week with the office.

## 2024-08-02 ENCOUNTER — PROCEDURE VISIT (OUTPATIENT)
Dept: NEUROLOGY | Age: 54
End: 2024-08-02

## 2024-08-02 VITALS — OXYGEN SATURATION: 97 % | BODY MASS INDEX: 45.15 KG/M2 | HEART RATE: 57 BPM | HEIGHT: 65 IN | WEIGHT: 271 LBS

## 2024-08-02 DIAGNOSIS — R20.0 NUMBNESS AND TINGLING IN LEFT HAND: Primary | ICD-10-CM

## 2024-08-02 DIAGNOSIS — R20.2 NUMBNESS AND TINGLING IN LEFT HAND: Primary | ICD-10-CM

## 2024-08-02 NOTE — PROGRESS NOTES
EMG/Nerve Conduction Study Procedure Note  [address]    [address]  [place]  [address]   [phone]      Hx:    Exam:     54 y.o.  W  female  Uppers...   CuTS... Left upper = walking dog fracture left wrist near the ulnar nerve.  There is weakness of ulnar intrinsics.  Minimal to no atrophy.  There is hypnesthesia at the left dorsal ulnar section.  Otherwise negative hand.  Referring: Dr. MADDY Coello  Technologist: Roly Villagran  Height: 5 foot 5 inch        Summary     EMG per per appropriate muscles selected with CV.                 Controlled environmental factors / EMG lab.  Temperature.   NCV : sensory segments:    Abnormal = left median SCV slowing with attenuated SNAP.  Normal left ulnar left radial SCV.  Special SCV = left dorsal cutaneous = =   abnormal left sensory dorsal ulnar cutaneous = bit slowed with attenuated amplitude.  Right dorsal ulnar cutaneous SCV.  NCV transcarpal sensory segments:     Abnormal slowing of the transcarpal median left normal transcarpal ulnar and a peak difference of latency at 0.48 ms (UL = 0.20 ms).  NCV Motor MCV segments:     Normal left median and ulnar MCV.  F-wave studies:      Basically normal F waves left median left ulnar nerves.        NEEDLE EMG:   Tested muscles::    Normal left FDI APB ADM EDC =  Normal insertional activity and interference pattern/recruitment.  No fasciculations fibrillations positive sharp waves.  Normal MUP.  No BSS AP.  No giant MUP.  No myotonia.  No upper motor neuron sign.          INTERPRETATION:     THESE FINDINGS ARE ELECTROPHYSIOLOGIC EVIDENCE OF ENTRAPPED MEDIAN NERVE ON THE LEFT OF A MINIMAL EARLY DEGREE.  THERE IS ALSO WHAT APPEARS TO BE A DISTAL CUTANEOUS LEFT ULNAR DORSAL NEUROPATHY OF A MINIMAL DEGREE AS WELL.  NO AXONAL DENERVATION.            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      Procedure Details:       Correlates with early

## 2024-10-18 ENCOUNTER — APPOINTMENT (OUTPATIENT)
Dept: GENERAL RADIOLOGY | Age: 54
End: 2024-10-18
Payer: COMMERCIAL

## 2024-10-18 ENCOUNTER — HOSPITAL ENCOUNTER (EMERGENCY)
Age: 54
Discharge: HOME | End: 2024-10-18
Attending: EMERGENCY MEDICINE
Payer: COMMERCIAL

## 2024-10-18 VITALS
RESPIRATION RATE: 18 BRPM | HEART RATE: 80 BPM | BODY MASS INDEX: 44.98 KG/M2 | OXYGEN SATURATION: 96 % | TEMPERATURE: 98.9 F | HEIGHT: 65 IN | SYSTOLIC BLOOD PRESSURE: 143 MMHG | WEIGHT: 270 LBS | DIASTOLIC BLOOD PRESSURE: 87 MMHG

## 2024-10-18 DIAGNOSIS — J20.9 ACUTE BRONCHITIS, UNSPECIFIED ORGANISM: Primary | ICD-10-CM

## 2024-10-18 LAB
ALBUMIN SERPL-MCNC: 3.5 G/DL (ref 3.5–5)
ALBUMIN/GLOB SERPL: 1.1 (ref 1–1.9)
ALP SERPL-CCNC: 79 U/L (ref 35–104)
ALT SERPL-CCNC: 27 U/L (ref 8–45)
ANION GAP SERPL CALC-SCNC: 11 MMOL/L (ref 9–18)
APPEARANCE UR: CLEAR
AST SERPL-CCNC: 22 U/L (ref 15–37)
BASOPHILS # BLD: 0 K/UL (ref 0–0.2)
BASOPHILS NFR BLD: 0 % (ref 0–2)
BILIRUB SERPL-MCNC: 0.4 MG/DL (ref 0–1.2)
BILIRUB UR QL: NEGATIVE
BUN SERPL-MCNC: 11 MG/DL (ref 6–23)
CALCIUM SERPL-MCNC: 9.3 MG/DL (ref 8.8–10.2)
CHLORIDE SERPL-SCNC: 102 MMOL/L (ref 98–107)
CO2 SERPL-SCNC: 28 MMOL/L (ref 20–28)
COLOR UR: NORMAL
CREAT SERPL-MCNC: 0.69 MG/DL (ref 0.6–1.1)
DIFFERENTIAL METHOD BLD: ABNORMAL
EKG ATRIAL RATE: 77 BPM
EKG DIAGNOSIS: NORMAL
EKG P AXIS: 41 DEGREES
EKG P-R INTERVAL: 160 MS
EKG Q-T INTERVAL: 380 MS
EKG QRS DURATION: 86 MS
EKG QTC CALCULATION (BAZETT): 428 MS
EKG R AXIS: 34 DEGREES
EKG T AXIS: 52 DEGREES
EKG VENTRICULAR RATE: 76 BPM
EOSINOPHIL # BLD: 0.2 K/UL (ref 0–0.8)
EOSINOPHIL NFR BLD: 2 % (ref 0.5–7.8)
ERYTHROCYTE [DISTWIDTH] IN BLOOD BY AUTOMATED COUNT: 13.3 % (ref 11.9–14.6)
GLOBULIN SER CALC-MCNC: 3.3 G/DL (ref 2.3–3.5)
GLUCOSE SERPL-MCNC: 149 MG/DL (ref 70–99)
GLUCOSE UR STRIP.AUTO-MCNC: NEGATIVE MG/DL
HCT VFR BLD AUTO: 37 % (ref 35.8–46.3)
HGB BLD-MCNC: 11.7 G/DL (ref 11.7–15.4)
HGB UR QL STRIP: NEGATIVE
IMM GRANULOCYTES # BLD AUTO: 0 K/UL (ref 0–0.5)
IMM GRANULOCYTES NFR BLD AUTO: 0 % (ref 0–5)
KETONES UR QL STRIP.AUTO: NEGATIVE MG/DL
LACTATE SERPL-SCNC: 1 MMOL/L (ref 0.5–2)
LEUKOCYTE ESTERASE UR QL STRIP.AUTO: NEGATIVE
LYMPHOCYTES # BLD: 1.8 K/UL (ref 0.5–4.6)
LYMPHOCYTES NFR BLD: 20 % (ref 13–44)
MCH RBC QN AUTO: 29 PG (ref 26.1–32.9)
MCHC RBC AUTO-ENTMCNC: 31.6 G/DL (ref 31.4–35)
MCV RBC AUTO: 91.6 FL (ref 82–102)
MONOCYTES # BLD: 0.3 K/UL (ref 0.1–1.3)
MONOCYTES NFR BLD: 4 % (ref 4–12)
NEUTS SEG # BLD: 6.9 K/UL (ref 1.7–8.2)
NEUTS SEG NFR BLD: 74 % (ref 43–78)
NITRITE UR QL STRIP.AUTO: NEGATIVE
NRBC # BLD: 0 K/UL (ref 0–0.2)
NT PRO BNP: 884 PG/ML (ref 0–125)
PH UR STRIP: 5.5 (ref 5–9)
PLATELET # BLD AUTO: 278 K/UL (ref 150–450)
PMV BLD AUTO: 10.3 FL (ref 9.4–12.3)
POTASSIUM SERPL-SCNC: 4.2 MMOL/L (ref 3.5–5.1)
PROCALCITONIN SERPL-MCNC: 0.04 NG/ML (ref 0–0.1)
PROT SERPL-MCNC: 6.8 G/DL (ref 6.3–8.2)
PROT UR STRIP-MCNC: NEGATIVE MG/DL
RBC # BLD AUTO: 4.04 M/UL (ref 4.05–5.2)
SARS-COV-2 RDRP RESP QL NAA+PROBE: NOT DETECTED
SODIUM SERPL-SCNC: 141 MMOL/L (ref 136–145)
SOURCE: NORMAL
SP GR UR REFRACTOMETRY: 1.01 (ref 1–1.02)
TROPONIN T SERPL HS-MCNC: 8 NG/L (ref 0–14)
TROPONIN T SERPL HS-MCNC: 8 NG/L (ref 0–14)
UROBILINOGEN UR QL STRIP.AUTO: 0.2 EU/DL (ref 0.2–1)
WBC # BLD AUTO: 9.3 K/UL (ref 4.3–11.1)

## 2024-10-18 PROCEDURE — 87635 SARS-COV-2 COVID-19 AMP PRB: CPT

## 2024-10-18 PROCEDURE — 99285 EMERGENCY DEPT VISIT HI MDM: CPT

## 2024-10-18 PROCEDURE — 83880 ASSAY OF NATRIURETIC PEPTIDE: CPT

## 2024-10-18 PROCEDURE — 84145 PROCALCITONIN (PCT): CPT

## 2024-10-18 PROCEDURE — 81003 URINALYSIS AUTO W/O SCOPE: CPT

## 2024-10-18 PROCEDURE — 80053 COMPREHEN METABOLIC PANEL: CPT

## 2024-10-18 PROCEDURE — 84484 ASSAY OF TROPONIN QUANT: CPT

## 2024-10-18 PROCEDURE — 71046 X-RAY EXAM CHEST 2 VIEWS: CPT

## 2024-10-18 PROCEDURE — 85025 COMPLETE CBC W/AUTO DIFF WBC: CPT

## 2024-10-18 PROCEDURE — 6370000000 HC RX 637 (ALT 250 FOR IP): Performed by: EMERGENCY MEDICINE

## 2024-10-18 PROCEDURE — 6360000002 HC RX W HCPCS: Performed by: EMERGENCY MEDICINE

## 2024-10-18 PROCEDURE — 94640 AIRWAY INHALATION TREATMENT: CPT

## 2024-10-18 PROCEDURE — 83605 ASSAY OF LACTIC ACID: CPT

## 2024-10-18 PROCEDURE — 93005 ELECTROCARDIOGRAM TRACING: CPT | Performed by: EMERGENCY MEDICINE

## 2024-10-18 RX ORDER — ALBUTEROL SULFATE 90 UG/1
2 INHALANT RESPIRATORY (INHALATION) 4 TIMES DAILY PRN
Qty: 18 G | Refills: 0 | Status: SHIPPED | OUTPATIENT
Start: 2024-10-18

## 2024-10-18 RX ORDER — IPRATROPIUM BROMIDE AND ALBUTEROL SULFATE 2.5; .5 MG/3ML; MG/3ML
1 SOLUTION RESPIRATORY (INHALATION)
Status: COMPLETED | OUTPATIENT
Start: 2024-10-18 | End: 2024-10-18

## 2024-10-18 RX ORDER — DEXAMETHASONE SODIUM PHOSPHATE 10 MG/ML
8 INJECTION INTRAMUSCULAR; INTRAVENOUS
Status: COMPLETED | OUTPATIENT
Start: 2024-10-18 | End: 2024-10-18

## 2024-10-18 RX ORDER — DEXAMETHASONE 2 MG/1
TABLET ORAL
Qty: 42 TABLET | Refills: 0 | Status: SHIPPED | OUTPATIENT
Start: 2024-10-18 | End: 2024-10-29

## 2024-10-18 RX ADMIN — IPRATROPIUM BROMIDE AND ALBUTEROL SULFATE 1 DOSE: 2.5; .5 SOLUTION RESPIRATORY (INHALATION) at 15:06

## 2024-10-18 RX ADMIN — DEXAMETHASONE SODIUM PHOSPHATE 8 MG: 10 INJECTION INTRAMUSCULAR; INTRAVENOUS at 15:11

## 2024-10-18 ASSESSMENT — PAIN - FUNCTIONAL ASSESSMENT: PAIN_FUNCTIONAL_ASSESSMENT: 0-10

## 2024-10-18 ASSESSMENT — LIFESTYLE VARIABLES
HOW OFTEN DO YOU HAVE A DRINK CONTAINING ALCOHOL: NEVER
HOW MANY STANDARD DRINKS CONTAINING ALCOHOL DO YOU HAVE ON A TYPICAL DAY: PATIENT DOES NOT DRINK

## 2024-10-18 ASSESSMENT — PAIN DESCRIPTION - DESCRIPTORS: DESCRIPTORS: PRESSURE

## 2024-10-18 ASSESSMENT — PAIN SCALES - GENERAL: PAINLEVEL_OUTOF10: 8

## 2024-10-18 ASSESSMENT — PAIN DESCRIPTION - LOCATION: LOCATION: CHEST

## 2024-10-18 NOTE — ED PROVIDER NOTES
EPINEPHrine (EPIPEN 2-IDALIA) 0.3 MG/0.3ML SOAJ injection Inject 0.3 mLs into the skin once for 1 dose Use as directed for allergic reaction, Disp-0.3 mL, R-0Normal      b complex vitamins capsule Take 1 capsule by mouth dailyHistorical Med      FLUoxetine (PROZAC) 40 MG capsule Take 1 capsule by mouth daily, Disp-90 capsule, R-1Normal      clonazePAM (KLONOPIN) 1 MG tablet Take 1 tablet by mouth in the morning and at bedtime for 30 days., Disp-60 tablet, R-0Normal      celecoxib (CELEBREX) 200 MG capsule Celebrex 200 mg capsule  Take 1 capsule twice a day by oral route., Disp-180 capsule, R-1Normal      omeprazole (PRILOSEC OTC) 20 MG tablet omeprazole 20 mg tablet,delayed release  Take 1 tablet every day by oral route., Disp-180 tablet, R-1Normal              Results for orders placed or performed during the hospital encounter of 10/18/24   COVID-19, Rapid    Specimen: Nasopharyngeal   Result Value Ref Range    Source Nasopharyngeal      SARS-CoV-2, Rapid Not detected NOTD     XR CHEST (2 VW)    Narrative    Chest X-ray    INDICATION: Shortness of Breath    COMPARISON: 5/4/2023    TECHNIQUE: PA and lateral views of the chest were obtained.    FINDINGS: The lungs are clear. There are no infiltrates or effusions.  The heart  size is normal.  The bony thorax is intact.        Impression    1. No acute findings in the chest.  2. No significant change when compared to the previous examination.      Electronically signed by Enrique Powell   Comprehensive Metabolic Panel   Result Value Ref Range    Sodium 141 136 - 145 mmol/L    Potassium 4.2 3.5 - 5.1 mmol/L    Chloride 102 98 - 107 mmol/L    CO2 28 20 - 28 mmol/L    Anion Gap 11 9 - 18 mmol/L    Glucose 149 (H) 70 - 99 mg/dL    BUN 11 6 - 23 MG/DL    Creatinine 0.69 0.60 - 1.10 MG/DL    Est, Glom Filt Rate >90 >60 ml/min/1.73m2    Calcium 9.3 8.8 - 10.2 MG/DL    Total Bilirubin 0.4 0.0 - 1.2 MG/DL    ALT 27 8 - 45 U/L    AST 22 15 - 37 U/L    Alk Phosphatase 79 35 -

## 2024-10-18 NOTE — ED TRIAGE NOTES
Pt presents with complaint of chest pressure & tightness, shortness of breath, cough, and increased heart rate.  Pt was referred by her doctor's office.

## 2024-10-18 NOTE — ED NOTES
Patient mobility status  with no difficulty. Provider aware     I have reviewed discharge instructions with the patient.  The patient verbalized understanding.    Patient left ED via Discharge Method: ambulatory to Home with self.    Opportunity for questions and clarification provided.     Patient given 2 scripts.

## 2024-10-28 ENCOUNTER — TELEPHONE (OUTPATIENT)
Dept: ORTHOPEDIC SURGERY | Age: 54
End: 2024-10-28

## 2024-10-28 NOTE — TELEPHONE ENCOUNTER
don't want to go to [facility] location 10/30 2:45 wants to reschedule when [facility] is avail

## 2024-10-28 NOTE — TELEPHONE ENCOUNTER
Called and spoke to pt to inform her that we will not be back at [facility] until the new year and confirm pt was alright with waiting until then to reschedule. Pt agreed and confirmed that was fine.

## (undated) DEVICE — SLING & SWATHE: Brand: DEROYAL

## (undated) DEVICE — [AGGRESSIVE 6-FLUTE BARREL BUR, ARTHROSCOPIC SHAVER BLADE,  DO NOT RESTERILIZE,  DO NOT USE IF PACKAGE IS DAMAGED,  KEEP DRY,  KEEP AWAY FROM SUNLIGHT]: Brand: FORMULA

## (undated) DEVICE — PAD,ABDOMINAL,5"X9",ST,LF,25/BX: Brand: MEDLINE INDUSTRIES, INC.

## (undated) DEVICE — SUTURE N ABSRB BRAIDED 2-0 MO-6 39 IN 26 MM 1/2 CIR BLU BLK 3910900023

## (undated) DEVICE — NEEDLE SPNL L3.5IN PNK HUB S STL REG WALL FIT STYL W/ QNCKE

## (undated) DEVICE — SHOULDER ARTHRO DR POSTA: Brand: MEDLINE INDUSTRIES, INC.

## (undated) DEVICE — SUTURE VCRL SZ 0 L27IN ABSRB UD L36MM CP-1 1/2 CIR REV CUT J267H

## (undated) DEVICE — [RESECTOR CUTTER, ARTHROSCOPIC SHAVER BLADE,  DO NOT RESTERILIZE,  DO NOT USE IF PACKAGE IS DAMAGED,  KEEP DRY,  KEEP AWAY FROM SUNLIGHT]: Brand: FORMULA

## (undated) DEVICE — SYRINGE MED 30ML STD CLR PLAS LUERLOCK TIP N CTRL DISP

## (undated) DEVICE — SUTURE PROL SZ 2-0 L18IN NONABSORBABLE BLU FS L26MM 3/8 CIR 8685H

## (undated) DEVICE — SOLUTION IRRIG 3000ML 0.9% SOD CHL USP UROMATIC PLAS CONT

## (undated) DEVICE — GAUZE,SPONGE,4"X4",16PLY,STRL,LF,10/TRAY: Brand: MEDLINE